# Patient Record
Sex: FEMALE | Race: WHITE | Employment: FULL TIME
[De-identification: names, ages, dates, MRNs, and addresses within clinical notes are randomized per-mention and may not be internally consistent; named-entity substitution may affect disease eponyms.]

---

## 2017-01-18 DIAGNOSIS — E55.9 VITAMIN D DEFICIENCY: ICD-10-CM

## 2017-01-18 RX ORDER — ERGOCALCIFEROL 1.25 MG/1
50000 CAPSULE ORAL WEEKLY
Qty: 8 CAPSULE | Refills: 0 | Status: SHIPPED | OUTPATIENT
Start: 2017-01-18 | End: 2017-04-12 | Stop reason: ALTCHOICE

## 2017-01-26 ENCOUNTER — OFFICE VISIT (OUTPATIENT)
Dept: BARIATRICS/WEIGHT MGMT | Facility: CLINIC | Age: 52
End: 2017-01-26

## 2017-01-26 VITALS — WEIGHT: 244 LBS | BODY MASS INDEX: 43.92 KG/M2

## 2017-01-26 VITALS
RESPIRATION RATE: 20 BRPM | HEIGHT: 63 IN | DIASTOLIC BLOOD PRESSURE: 70 MMHG | HEART RATE: 70 BPM | SYSTOLIC BLOOD PRESSURE: 122 MMHG

## 2017-01-26 DIAGNOSIS — E66.01 MORBID OBESITY WITH BMI OF 40.0-44.9, ADULT (HCC): ICD-10-CM

## 2017-01-26 DIAGNOSIS — E66.01 MORBID OBESITY WITH BMI OF 40.0-44.9, ADULT (HCC): Primary | ICD-10-CM

## 2017-01-26 DIAGNOSIS — E55.9 VITAMIN D DEFICIENCY: Primary | ICD-10-CM

## 2017-01-26 PROCEDURE — 99213 OFFICE O/P EST LOW 20 MIN: CPT | Performed by: SURGERY

## 2017-01-26 RX ORDER — PHENTERMINE HYDROCHLORIDE 15 MG/1
15 CAPSULE ORAL EVERY MORNING
Qty: 30 CAPSULE | Refills: 0 | Status: SHIPPED | OUTPATIENT
Start: 2017-01-26 | End: 2017-02-25

## 2017-01-31 ENCOUNTER — TELEPHONE (OUTPATIENT)
Dept: BARIATRICS/WEIGHT MGMT | Facility: CLINIC | Age: 52
End: 2017-01-31

## 2017-03-08 ENCOUNTER — OFFICE VISIT (OUTPATIENT)
Dept: BARIATRICS/WEIGHT MGMT | Facility: CLINIC | Age: 52
End: 2017-03-08

## 2017-03-08 VITALS
WEIGHT: 231 LBS | HEART RATE: 64 BPM | HEIGHT: 63 IN | SYSTOLIC BLOOD PRESSURE: 107 MMHG | BODY MASS INDEX: 40.93 KG/M2 | DIASTOLIC BLOOD PRESSURE: 75 MMHG

## 2017-03-08 DIAGNOSIS — E04.2 NONTOXIC MULTINODULAR GOITER: ICD-10-CM

## 2017-03-08 DIAGNOSIS — E55.9 VITAMIN D DEFICIENCY: Primary | ICD-10-CM

## 2017-03-08 DIAGNOSIS — E66.01 MORBID OBESITY, UNSPECIFIED OBESITY TYPE (HCC): ICD-10-CM

## 2017-03-08 PROCEDURE — 99213 OFFICE O/P EST LOW 20 MIN: CPT | Performed by: SURGERY

## 2017-03-08 RX ORDER — PHENTERMINE HYDROCHLORIDE 37.5 MG/1
37.5 TABLET ORAL
Qty: 30 TABLET | Refills: 0 | Status: SHIPPED | OUTPATIENT
Start: 2017-03-08 | End: 2017-04-07

## 2017-03-08 RX ORDER — PHENTERMINE HYDROCHLORIDE 37.5 MG/1
CAPSULE ORAL
Refills: 0 | COMMUNITY
Start: 2017-02-02 | End: 2017-04-12 | Stop reason: DRUGHIGH

## 2017-04-12 ENCOUNTER — OFFICE VISIT (OUTPATIENT)
Dept: BARIATRICS/WEIGHT MGMT | Age: 52
End: 2017-04-12

## 2017-04-12 VITALS
SYSTOLIC BLOOD PRESSURE: 104 MMHG | HEIGHT: 63 IN | DIASTOLIC BLOOD PRESSURE: 83 MMHG | WEIGHT: 225 LBS | RESPIRATION RATE: 20 BRPM | BODY MASS INDEX: 39.87 KG/M2 | HEART RATE: 74 BPM

## 2017-04-12 DIAGNOSIS — E66.01 MORBID OBESITY, UNSPECIFIED OBESITY TYPE (HCC): Primary | ICD-10-CM

## 2017-04-12 PROCEDURE — 99212 OFFICE O/P EST SF 10 MIN: CPT | Performed by: SURGERY

## 2017-04-12 RX ORDER — PHENTERMINE HYDROCHLORIDE 15 MG/1
15 CAPSULE ORAL EVERY MORNING
Qty: 30 CAPSULE | Refills: 0 | Status: SHIPPED | OUTPATIENT
Start: 2017-04-12 | End: 2017-05-12

## 2017-05-04 ENCOUNTER — TELEPHONE (OUTPATIENT)
Dept: BARIATRICS/WEIGHT MGMT | Age: 52
End: 2017-05-04

## 2017-10-11 ENCOUNTER — OFFICE VISIT (OUTPATIENT)
Dept: BARIATRICS/WEIGHT MGMT | Age: 52
End: 2017-10-11
Payer: COMMERCIAL

## 2017-10-11 VITALS
HEIGHT: 63 IN | HEART RATE: 72 BPM | WEIGHT: 235 LBS | DIASTOLIC BLOOD PRESSURE: 61 MMHG | SYSTOLIC BLOOD PRESSURE: 79 MMHG | BODY MASS INDEX: 41.64 KG/M2 | RESPIRATION RATE: 20 BRPM

## 2017-10-11 DIAGNOSIS — E66.01 MORBID OBESITY WITH BMI OF 40.0-44.9, ADULT (HCC): ICD-10-CM

## 2017-10-11 DIAGNOSIS — E55.9 VITAMIN D DEFICIENCY: Primary | ICD-10-CM

## 2017-10-11 PROCEDURE — 99213 OFFICE O/P EST LOW 20 MIN: CPT | Performed by: SURGERY

## 2017-10-11 RX ORDER — PHENTERMINE HYDROCHLORIDE 37.5 MG/1
37.5 TABLET ORAL
Qty: 30 TABLET | Refills: 0 | Status: SHIPPED | OUTPATIENT
Start: 2017-10-11 | End: 2017-11-10

## 2017-10-12 NOTE — PROGRESS NOTES
Medical Nutrition Therapy  Weight Loss Medication  Initial Consult    Pt reports:  Pt reports that she identifies no needs at this time; she is not sure why she is scheduled with dietitian; was seen originally for weight loss medication but did not return for follow up after one month; today's appt was not given the appropriate time to review and set goals    Please see note dated 1/26/17 for original assessment  In 3 months what behaviors would you like to be doing on a consistent basis: Pt does not have 3 months goals in place   5% weight loss goal over 3 months: 11.75 lbs. Goal weight: 223.25 lbs. Vitals: Wt Readings from Last 3 Encounters:   10/11/17 235 lb (106.6 kg)   04/12/17 225 lb (102.1 kg)   04/05/17 228 lb (103.4 kg)     gained 10 lbs over 6 months     Bariatric Surgery patients:  Goals   60-80gm of protein  48-64oz of fluid  Vitamin and calcium consistency  Consistency with post op visits  Did not review these goals with the patient today     [] met     []  Not met    Nutrition Assessment:   PES: Knowledge deficit related to healthy behaviors that support weight management evidenced by Body mass index is 42.3 kg/m². Nutrition Assessment of Goal Attainment:  3 month goals: see above Does not have three month goals at this time  Bi Weekly TREATMENT GOALS:        Did not set biweekly treatment goals today                                               Do you understand your goals? n/a    Do you have the information you need to achieve your goals? n/a    Do you have any questions  right now? n/a        []n/a  Consistent goal achievement thus far and further success with goals is expected. n/a  Unable to consistently make progress in goal achievement. At this time patient is not moving forward  in developing the skills needed for long term success with managing weight and health. Recommend patient follow up with therapist. D/W provider. Plan:    Set three month and biweekly goals.
need for consistency with MVI and Ca supplements  Return in 1 month

## 2017-11-07 ENCOUNTER — TELEPHONE (OUTPATIENT)
Dept: OBGYN | Age: 52
End: 2017-11-07

## 2017-11-07 DIAGNOSIS — N95.2 VAGINAL ATROPHY: Primary | ICD-10-CM

## 2017-11-07 RX ORDER — ESTRADIOL 0.1 MG/G
1 CREAM VAGINAL DAILY
Qty: 1 TUBE | Refills: 3 | Status: SHIPPED | OUTPATIENT
Start: 2017-11-07 | End: 2018-10-23 | Stop reason: SDUPTHER

## 2017-11-07 NOTE — TELEPHONE ENCOUNTER
Estrace vaginal cream is sent, please have pt schedule an appointment in 1 month to f/u with symptoms following initiation of treatment.

## 2017-11-08 ENCOUNTER — OFFICE VISIT (OUTPATIENT)
Dept: BARIATRICS/WEIGHT MGMT | Age: 52
End: 2017-11-08
Payer: COMMERCIAL

## 2017-11-08 VITALS
HEIGHT: 63 IN | WEIGHT: 233 LBS | SYSTOLIC BLOOD PRESSURE: 116 MMHG | HEART RATE: 76 BPM | BODY MASS INDEX: 41.29 KG/M2 | RESPIRATION RATE: 20 BRPM | DIASTOLIC BLOOD PRESSURE: 86 MMHG

## 2017-11-08 DIAGNOSIS — I48.91 ATRIAL FIBRILLATION WITH RVR (HCC): Primary | ICD-10-CM

## 2017-11-08 DIAGNOSIS — E66.01 MORBID OBESITY WITH BMI OF 40.0-44.9, ADULT (HCC): ICD-10-CM

## 2017-11-08 PROCEDURE — 99213 OFFICE O/P EST LOW 20 MIN: CPT | Performed by: SURGERY

## 2017-11-08 NOTE — PROGRESS NOTES
The patient is here today for continued supervised weight loss in preparation for undergoing metabolic surgery. She reports that she is working on the behavior changes discussed at her initial appointment and has ongoing collaboration with our RD to continue this work. Overall, the patient reports that she is having good success with her behavior changes and compliance with our program.   She was given her first month of Adipex which she only just got filled about 1 week ago. She has been taking it and reports no issues    Weight is down 2 lbs. Exercising? :  yes    Patient is exhibiting compliance with behavior modification: yes    Vitals:    11/08/17 1036   BP: 116/86   Site: Left Arm   Position: Sitting   Cuff Size: Large Adult   Pulse: 76   Resp: 20   Weight: 233 lb (105.7 kg)   Height: 5' 2.5\" (1.588 m)     Body mass index is 41.94 kg/m². Gen: Alert and oriented x 3; NAD  HEENT: MMM No Rhinorrhea, EOMI; PERRLA; Thyroid not enlarged; no cervical nodes  ABD:  Soft, Non-Tender, Non-distended, No rebound or guarding. Ext:  Movement x 4. No edema  Neuro: Cranial Nerves Grossly Intact; nml coordination, no pronator drift     Assessment:    Patient Active Problem List   Diagnosis    Anemia    Colon cancer screening    Chest pain    New onset atrial fibrillation (HCC)    Atrial fibrillation with RVR (HCC)    Chest discomfort    Obesity    Nontoxic multinodular goiter    Epigastric pain    Vitamin D deficiency                              Afib-stable  Morbid Obesity-stable      Plan:  Continue working with staff on behavior changes  Encouraged to journal all food intake. Keep calorie level at approximately 3814-2034 or as determined by RD. Protein intake is to be a minimum of 60-80 grams per day. Water drinking was encouraged with a goal of 64oz-128oz daily. I also emphasized the need for use of the exercise log              She will return in month for continued supervision.     It is

## 2018-01-03 ENCOUNTER — OFFICE VISIT (OUTPATIENT)
Dept: OBGYN | Age: 53
End: 2018-01-03
Payer: COMMERCIAL

## 2018-01-03 VITALS
DIASTOLIC BLOOD PRESSURE: 74 MMHG | WEIGHT: 238 LBS | SYSTOLIC BLOOD PRESSURE: 122 MMHG | BODY MASS INDEX: 43.79 KG/M2 | HEIGHT: 62 IN

## 2018-01-03 DIAGNOSIS — N94.10 DYSPAREUNIA, FEMALE: Primary | ICD-10-CM

## 2018-01-03 PROCEDURE — 99213 OFFICE O/P EST LOW 20 MIN: CPT | Performed by: OBSTETRICS & GYNECOLOGY

## 2018-01-03 NOTE — PROGRESS NOTES
PROBLEM VISIT     Date of service: 1/3/2018    Andres Alvarado  Is a 47 y.o.  female    PT's PCP is: Janel Greene MD     : 1965                                             Subjective:       No LMP recorded. Patient has had a hysterectomy. OB History   No data available        Allergies: Codeine    Chief Complaint   Patient presents with    Discuss Medications     patient presents today for F/U new start Estrace for vaginal atrophy. PE:  Vital Signs  Blood pressure 122/74, height 5' 2\" (1.575 m), weight 238 lb (108 kg), not currently breastfeeding. Labs:    No results found for this visit on 18. NURSE: SOFIA    HPI: Pt returns for evaluation of dyspareunia and condyloma. She is s/p MLT and was noted to have condyloma present at the inferior introitus at her last follow up. It was treated w/ TCA and she was started on Estrace cream 2x/wk. She states she has been doing well, has not had much discomfort since initiation of cream. She denies frequent intimacy but states it is unrelated to her discomfort at this point. Objective:   Physical Exam   Constitutional: She is well-developed, well-nourished, and in no distress. Cardiovascular: Normal rate and regular rhythm. Pulmonary/Chest: Effort normal and breath sounds normal.   Abdominal: Soft. There is no tenderness. Genitourinary: Vagina normal.   Genitourinary Comments: External genitalia wnl, well estrogenized, no evidence of condyloma   Musculoskeletal: Normal range of motion. Assessment and Plan       1. Dyspareunia, female           Improved w/ supplemental estrogen. Pt will call w/ concerns and otherwise return for annuals. Discussed sx of condyloma and treatment options if they recur.          FF time: 15 min    Over 75%of time spent on counseling and care coordination on: see assessment and plan,  She was also counseled on her preventative health maintenance recommendations and follow-up.       Shannen Todd,1/3/2018 10:58 AM

## 2018-09-10 ENCOUNTER — HOSPITAL ENCOUNTER (OUTPATIENT)
Dept: WOMENS IMAGING | Age: 53
Discharge: HOME | End: 2018-09-12
Payer: COMMERCIAL

## 2018-09-10 ENCOUNTER — HOSPITAL ENCOUNTER (OUTPATIENT)
Dept: BONE DENSITY | Age: 53
Discharge: HOME | End: 2018-09-12
Payer: COMMERCIAL

## 2018-09-10 DIAGNOSIS — Z12.39 SCREENING BREAST EXAMINATION: ICD-10-CM

## 2018-09-10 DIAGNOSIS — Z13.820 SCREENING FOR OSTEOPOROSIS: ICD-10-CM

## 2018-09-10 PROCEDURE — 77067 SCR MAMMO BI INCL CAD: CPT

## 2018-09-10 PROCEDURE — 77080 DXA BONE DENSITY AXIAL: CPT

## 2018-10-23 ENCOUNTER — OFFICE VISIT (OUTPATIENT)
Dept: OBGYN | Age: 53
End: 2018-10-23
Payer: COMMERCIAL

## 2018-10-23 ENCOUNTER — HOSPITAL ENCOUNTER (OUTPATIENT)
Age: 53
Setting detail: SPECIMEN
Discharge: HOME | End: 2018-10-23
Payer: COMMERCIAL

## 2018-10-23 VITALS
SYSTOLIC BLOOD PRESSURE: 124 MMHG | BODY MASS INDEX: 45.27 KG/M2 | HEIGHT: 62 IN | WEIGHT: 246 LBS | DIASTOLIC BLOOD PRESSURE: 80 MMHG

## 2018-10-23 DIAGNOSIS — Z01.419 WOMEN'S ANNUAL ROUTINE GYNECOLOGICAL EXAMINATION: ICD-10-CM

## 2018-10-23 DIAGNOSIS — Z01.419 WOMEN'S ANNUAL ROUTINE GYNECOLOGICAL EXAMINATION: Primary | ICD-10-CM

## 2018-10-23 PROCEDURE — 87624 HPV HI-RISK TYP POOLED RSLT: CPT

## 2018-10-23 PROCEDURE — 99396 PREV VISIT EST AGE 40-64: CPT | Performed by: OBSTETRICS & GYNECOLOGY

## 2018-10-23 PROCEDURE — G0145 SCR C/V CYTO,THINLAYER,RESCR: HCPCS

## 2018-10-23 RX ORDER — ESTRADIOL 0.1 MG/G
1 CREAM VAGINAL
Qty: 1 TUBE | Refills: 3 | Status: SHIPPED | OUTPATIENT
Start: 2018-10-25

## 2018-10-23 ASSESSMENT — ENCOUNTER SYMPTOMS: SHORTNESS OF BREATH: 0

## 2018-10-24 LAB
HPV SAMPLE: NORMAL
HPV SOURCE: NORMAL
HPV, GENOTYPE 16: NOT DETECTED
HPV, GENOTYPE 18: NOT DETECTED
HPV, HIGH RISK OTHER: NOT DETECTED
HPV, INTERPRETATION: NORMAL

## 2018-11-06 LAB — CYTOLOGY REPORT: NORMAL

## 2019-01-21 DIAGNOSIS — M81.0 OSTEOPOROSIS, UNSPECIFIED OSTEOPOROSIS TYPE, UNSPECIFIED PATHOLOGICAL FRACTURE PRESENCE: ICD-10-CM

## 2019-01-21 RX ORDER — DIPHENHYDRAMINE HYDROCHLORIDE 50 MG/ML
50 INJECTION INTRAMUSCULAR; INTRAVENOUS ONCE
Status: CANCELLED | OUTPATIENT
Start: 2019-01-21 | End: 2019-01-21

## 2019-01-21 RX ORDER — METHYLPREDNISOLONE SODIUM SUCCINATE 125 MG/2ML
125 INJECTION, POWDER, LYOPHILIZED, FOR SOLUTION INTRAMUSCULAR; INTRAVENOUS ONCE
Status: CANCELLED | OUTPATIENT
Start: 2019-01-21 | End: 2019-01-21

## 2019-01-21 RX ORDER — ZOLEDRONIC ACID 5 MG/100ML
5 INJECTION, SOLUTION INTRAVENOUS ONCE
Status: CANCELLED | OUTPATIENT
Start: 2019-01-21 | End: 2019-01-21

## 2019-01-21 RX ORDER — HEPARIN SODIUM (PORCINE) LOCK FLUSH IV SOLN 100 UNIT/ML 100 UNIT/ML
500 SOLUTION INTRAVENOUS PRN
Status: CANCELLED | OUTPATIENT
Start: 2019-01-21

## 2019-01-21 RX ORDER — SODIUM CHLORIDE 0.9 % (FLUSH) 0.9 %
5 SYRINGE (ML) INJECTION PRN
Status: CANCELLED | OUTPATIENT
Start: 2019-01-21

## 2019-01-21 RX ORDER — 0.9 % SODIUM CHLORIDE 0.9 %
500 INTRAVENOUS SOLUTION INTRAVENOUS ONCE
Status: CANCELLED | OUTPATIENT
Start: 2019-01-21 | End: 2019-01-21

## 2019-01-21 RX ORDER — SODIUM CHLORIDE 9 MG/ML
INJECTION, SOLUTION INTRAVENOUS CONTINUOUS
Status: CANCELLED | OUTPATIENT
Start: 2019-01-21

## 2019-01-21 RX ORDER — SODIUM CHLORIDE 9 MG/ML
INJECTION, SOLUTION INTRAVENOUS ONCE
Status: CANCELLED | OUTPATIENT
Start: 2019-01-21 | End: 2019-01-21

## 2019-01-21 RX ORDER — 0.9 % SODIUM CHLORIDE 0.9 %
250 INTRAVENOUS SOLUTION INTRAVENOUS ONCE
Status: CANCELLED | OUTPATIENT
Start: 2019-01-21 | End: 2019-01-21

## 2019-01-21 RX ORDER — 0.9 % SODIUM CHLORIDE 0.9 %
10 VIAL (ML) INJECTION ONCE
Status: CANCELLED | OUTPATIENT
Start: 2019-01-21 | End: 2019-01-21

## 2019-01-21 RX ORDER — EPINEPHRINE 1 MG/ML
0.3 INJECTION, SOLUTION, CONCENTRATE INTRAVENOUS PRN
Status: CANCELLED | OUTPATIENT
Start: 2019-01-21

## 2019-01-21 RX ORDER — SODIUM CHLORIDE 0.9 % (FLUSH) 0.9 %
10 SYRINGE (ML) INJECTION PRN
Status: CANCELLED | OUTPATIENT
Start: 2019-01-21

## 2019-02-04 ENCOUNTER — HOSPITAL ENCOUNTER (OUTPATIENT)
Dept: INFUSION THERAPY | Age: 54
Discharge: HOME | End: 2019-02-04
Payer: COMMERCIAL

## 2019-02-04 VITALS
TEMPERATURE: 98.3 F | RESPIRATION RATE: 20 BRPM | SYSTOLIC BLOOD PRESSURE: 123 MMHG | HEART RATE: 56 BPM | DIASTOLIC BLOOD PRESSURE: 74 MMHG

## 2019-02-04 DIAGNOSIS — M81.0 OSTEOPOROSIS, UNSPECIFIED OSTEOPOROSIS TYPE, UNSPECIFIED PATHOLOGICAL FRACTURE PRESENCE: ICD-10-CM

## 2019-02-04 PROCEDURE — 6360000002 HC RX W HCPCS: Performed by: FAMILY MEDICINE

## 2019-02-04 PROCEDURE — 96365 THER/PROPH/DIAG IV INF INIT: CPT

## 2019-02-04 PROCEDURE — 2580000003 HC RX 258: Performed by: FAMILY MEDICINE

## 2019-02-04 RX ORDER — 0.9 % SODIUM CHLORIDE 0.9 %
10 VIAL (ML) INJECTION ONCE
Status: CANCELLED | OUTPATIENT
Start: 2019-02-04 | End: 2019-02-04

## 2019-02-04 RX ORDER — EPINEPHRINE 1 MG/ML
0.3 INJECTION, SOLUTION, CONCENTRATE INTRAVENOUS PRN
Status: CANCELLED | OUTPATIENT
Start: 2019-02-04

## 2019-02-04 RX ORDER — 0.9 % SODIUM CHLORIDE 0.9 %
250 INTRAVENOUS SOLUTION INTRAVENOUS ONCE
Status: CANCELLED | OUTPATIENT
Start: 2019-02-04 | End: 2019-02-04

## 2019-02-04 RX ORDER — SODIUM CHLORIDE 9 MG/ML
INJECTION, SOLUTION INTRAVENOUS ONCE
Status: CANCELLED | OUTPATIENT
Start: 2019-02-04 | End: 2019-02-04

## 2019-02-04 RX ORDER — 0.9 % SODIUM CHLORIDE 0.9 %
500 INTRAVENOUS SOLUTION INTRAVENOUS ONCE
Status: CANCELLED | OUTPATIENT
Start: 2019-02-04 | End: 2019-02-04

## 2019-02-04 RX ORDER — CHOLECALCIFEROL (VITAMIN D3) 125 MCG
500 CAPSULE ORAL DAILY
COMMUNITY

## 2019-02-04 RX ORDER — SODIUM CHLORIDE 9 MG/ML
INJECTION, SOLUTION INTRAVENOUS CONTINUOUS
Status: CANCELLED | OUTPATIENT
Start: 2019-02-04

## 2019-02-04 RX ORDER — DIPHENHYDRAMINE HYDROCHLORIDE 50 MG/ML
50 INJECTION INTRAMUSCULAR; INTRAVENOUS ONCE
Status: CANCELLED | OUTPATIENT
Start: 2019-02-04 | End: 2019-02-04

## 2019-02-04 RX ORDER — SODIUM CHLORIDE 0.9 % (FLUSH) 0.9 %
5 SYRINGE (ML) INJECTION PRN
Status: CANCELLED | OUTPATIENT
Start: 2019-02-04

## 2019-02-04 RX ORDER — ZOLEDRONIC ACID 5 MG/100ML
5 INJECTION, SOLUTION INTRAVENOUS ONCE
Status: CANCELLED | OUTPATIENT
Start: 2019-02-04 | End: 2019-02-04

## 2019-02-04 RX ORDER — SODIUM CHLORIDE 0.9 % (FLUSH) 0.9 %
10 SYRINGE (ML) INJECTION PRN
Status: CANCELLED | OUTPATIENT
Start: 2019-02-04

## 2019-02-04 RX ORDER — HEPARIN SODIUM (PORCINE) LOCK FLUSH IV SOLN 100 UNIT/ML 100 UNIT/ML
500 SOLUTION INTRAVENOUS PRN
Status: CANCELLED | OUTPATIENT
Start: 2019-02-04

## 2019-02-04 RX ORDER — SODIUM CHLORIDE 9 MG/ML
INJECTION, SOLUTION INTRAVENOUS ONCE
Status: COMPLETED | OUTPATIENT
Start: 2019-02-04 | End: 2019-02-04

## 2019-02-04 RX ORDER — ZOLEDRONIC ACID 5 MG/100ML
5 INJECTION, SOLUTION INTRAVENOUS ONCE
Status: COMPLETED | OUTPATIENT
Start: 2019-02-04 | End: 2019-02-04

## 2019-02-04 RX ORDER — SODIUM CHLORIDE 0.9 % (FLUSH) 0.9 %
10 SYRINGE (ML) INJECTION PRN
Status: DISCONTINUED | OUTPATIENT
Start: 2019-02-04 | End: 2019-02-05 | Stop reason: HOSPADM

## 2019-02-04 RX ORDER — METHYLPREDNISOLONE SODIUM SUCCINATE 125 MG/2ML
125 INJECTION, POWDER, LYOPHILIZED, FOR SOLUTION INTRAMUSCULAR; INTRAVENOUS ONCE
Status: CANCELLED | OUTPATIENT
Start: 2019-02-04 | End: 2019-02-04

## 2019-02-04 RX ADMIN — ZOLEDRONIC ACID 5 MG: 5 INJECTION, SOLUTION INTRAVENOUS at 11:52

## 2019-02-04 RX ADMIN — SODIUM CHLORIDE: 9 INJECTION, SOLUTION INTRAVENOUS at 11:48

## 2019-02-04 RX ADMIN — Medication 10 ML: at 11:47

## 2019-07-24 ENCOUNTER — HOSPITAL ENCOUNTER (EMERGENCY)
Age: 54
Discharge: HOME | End: 2019-07-24
Attending: EMERGENCY MEDICINE
Payer: COMMERCIAL

## 2019-07-24 ENCOUNTER — APPOINTMENT (OUTPATIENT)
Dept: GENERAL RADIOLOGY | Age: 54
End: 2019-07-24
Payer: COMMERCIAL

## 2019-07-24 VITALS
DIASTOLIC BLOOD PRESSURE: 82 MMHG | BODY MASS INDEX: 42.8 KG/M2 | WEIGHT: 234 LBS | HEART RATE: 88 BPM | TEMPERATURE: 98.3 F | SYSTOLIC BLOOD PRESSURE: 121 MMHG | OXYGEN SATURATION: 96 % | RESPIRATION RATE: 16 BRPM

## 2019-07-24 DIAGNOSIS — R07.9 CHEST PAIN, UNSPECIFIED TYPE: Primary | ICD-10-CM

## 2019-07-24 LAB
ABSOLUTE EOS #: 0.22 K/UL (ref 0–0.44)
ABSOLUTE IMMATURE GRANULOCYTE: 0.03 K/UL (ref 0–0.3)
ABSOLUTE LYMPH #: 1.94 K/UL (ref 1.1–3.7)
ABSOLUTE MONO #: 0.56 K/UL (ref 0.1–1.2)
ANION GAP SERPL CALCULATED.3IONS-SCNC: 12 MMOL/L (ref 9–17)
BASOPHILS # BLD: 1 % (ref 0–2)
BASOPHILS ABSOLUTE: 0.05 K/UL (ref 0–0.2)
BNP INTERPRETATION: NORMAL
BUN BLDV-MCNC: 21 MG/DL (ref 6–20)
BUN/CREAT BLD: 30 (ref 9–20)
CALCIUM SERPL-MCNC: 9.3 MG/DL (ref 8.6–10.4)
CHLORIDE BLD-SCNC: 103 MMOL/L (ref 98–107)
CO2: 26 MMOL/L (ref 20–31)
CREAT SERPL-MCNC: 0.69 MG/DL (ref 0.5–0.9)
DIFFERENTIAL TYPE: NORMAL
EOSINOPHILS RELATIVE PERCENT: 3 % (ref 1–4)
GFR AFRICAN AMERICAN: >60 ML/MIN
GFR NON-AFRICAN AMERICAN: >60 ML/MIN
GFR SERPL CREATININE-BSD FRML MDRD: ABNORMAL ML/MIN/{1.73_M2}
GFR SERPL CREATININE-BSD FRML MDRD: ABNORMAL ML/MIN/{1.73_M2}
GLUCOSE BLD-MCNC: 97 MG/DL (ref 70–99)
HCT VFR BLD CALC: 42.2 % (ref 36.3–47.1)
HEMOGLOBIN: 13 G/DL (ref 11.9–15.1)
IMMATURE GRANULOCYTES: 0 %
LYMPHOCYTES # BLD: 24 % (ref 24–43)
MCH RBC QN AUTO: 27.7 PG (ref 25.2–33.5)
MCHC RBC AUTO-ENTMCNC: 30.8 G/DL (ref 28.4–34.8)
MCV RBC AUTO: 90 FL (ref 82.6–102.9)
MONOCYTES # BLD: 7 % (ref 3–12)
NRBC AUTOMATED: 0 PER 100 WBC
PDW BLD-RTO: 14 % (ref 11.8–14.4)
PLATELET # BLD: 236 K/UL (ref 138–453)
PLATELET ESTIMATE: NORMAL
PMV BLD AUTO: 11.3 FL (ref 8.1–13.5)
POTASSIUM SERPL-SCNC: 4 MMOL/L (ref 3.7–5.3)
PRO-BNP: 27 PG/ML
RBC # BLD: 4.69 M/UL (ref 3.95–5.11)
RBC # BLD: NORMAL 10*6/UL
SEG NEUTROPHILS: 65 % (ref 36–65)
SEGMENTED NEUTROPHILS ABSOLUTE COUNT: 5.18 K/UL (ref 1.5–8.1)
SODIUM BLD-SCNC: 141 MMOL/L (ref 135–144)
TROPONIN INTERP: NORMAL
TROPONIN INTERP: NORMAL
TROPONIN T: <0.03 NG/ML
TROPONIN T: <0.03 NG/ML
TROPONIN, HIGH SENSITIVITY: NORMAL NG/L (ref 0–14)
TROPONIN, HIGH SENSITIVITY: NORMAL NG/L (ref 0–14)
WBC # BLD: 8 K/UL (ref 3.5–11.3)
WBC # BLD: NORMAL 10*3/UL

## 2019-07-24 PROCEDURE — 99285 EMERGENCY DEPT VISIT HI MDM: CPT

## 2019-07-24 PROCEDURE — 84484 ASSAY OF TROPONIN QUANT: CPT

## 2019-07-24 PROCEDURE — 80048 BASIC METABOLIC PNL TOTAL CA: CPT

## 2019-07-24 PROCEDURE — 93005 ELECTROCARDIOGRAM TRACING: CPT | Performed by: EMERGENCY MEDICINE

## 2019-07-24 PROCEDURE — 83880 ASSAY OF NATRIURETIC PEPTIDE: CPT

## 2019-07-24 PROCEDURE — 71046 X-RAY EXAM CHEST 2 VIEWS: CPT

## 2019-07-24 PROCEDURE — 6370000000 HC RX 637 (ALT 250 FOR IP): Performed by: EMERGENCY MEDICINE

## 2019-07-24 PROCEDURE — 85025 COMPLETE CBC W/AUTO DIFF WBC: CPT

## 2019-07-24 PROCEDURE — 2580000003 HC RX 258: Performed by: EMERGENCY MEDICINE

## 2019-07-24 PROCEDURE — 36415 COLL VENOUS BLD VENIPUNCTURE: CPT

## 2019-07-24 RX ORDER — ASPIRIN 81 MG/1
324 TABLET, CHEWABLE ORAL ONCE
Status: COMPLETED | OUTPATIENT
Start: 2019-07-24 | End: 2019-07-24

## 2019-07-24 RX ORDER — 0.9 % SODIUM CHLORIDE 0.9 %
1000 INTRAVENOUS SOLUTION INTRAVENOUS ONCE
Status: COMPLETED | OUTPATIENT
Start: 2019-07-24 | End: 2019-07-24

## 2019-07-24 RX ORDER — FENTANYL CITRATE 50 UG/ML
50 INJECTION, SOLUTION INTRAMUSCULAR; INTRAVENOUS ONCE
Status: DISCONTINUED | OUTPATIENT
Start: 2019-07-24 | End: 2019-07-25 | Stop reason: HOSPADM

## 2019-07-24 RX ORDER — NAPROXEN 500 MG/1
500 TABLET ORAL 2 TIMES DAILY WITH MEALS
Qty: 60 TABLET | Refills: 0 | Status: SHIPPED | OUTPATIENT
Start: 2019-07-24 | End: 2019-07-25 | Stop reason: ALTCHOICE

## 2019-07-24 RX ORDER — NITROGLYCERIN 0.4 MG/1
0.4 TABLET SUBLINGUAL ONCE
Status: COMPLETED | OUTPATIENT
Start: 2019-07-24 | End: 2019-07-24

## 2019-07-24 RX ADMIN — NITROGLYCERIN 0.4 MG: 0.4 TABLET SUBLINGUAL at 19:38

## 2019-07-24 RX ADMIN — SODIUM CHLORIDE 1000 ML: 9 INJECTION, SOLUTION INTRAVENOUS at 19:30

## 2019-07-24 RX ADMIN — ASPIRIN 81 MG 324 MG: 81 TABLET ORAL at 19:32

## 2019-07-24 ASSESSMENT — ENCOUNTER SYMPTOMS
RHINORRHEA: 0
WHEEZING: 0
NAUSEA: 0
SHORTNESS OF BREATH: 0
ABDOMINAL PAIN: 0
VOMITING: 0
COLOR CHANGE: 0
BACK PAIN: 0

## 2019-07-24 ASSESSMENT — PAIN DESCRIPTION - FREQUENCY: FREQUENCY: CONTINUOUS

## 2019-07-24 ASSESSMENT — PAIN SCALES - GENERAL
PAINLEVEL_OUTOF10: 4
PAINLEVEL_OUTOF10: 0

## 2019-07-24 ASSESSMENT — HEART SCORE: ECG: 0

## 2019-07-24 ASSESSMENT — PAIN DESCRIPTION - PAIN TYPE: TYPE: ACUTE PAIN

## 2019-07-24 ASSESSMENT — PAIN DESCRIPTION - DESCRIPTORS: DESCRIPTORS: PRESSURE

## 2019-07-24 ASSESSMENT — PAIN DESCRIPTION - ORIENTATION: ORIENTATION: MID

## 2019-07-24 ASSESSMENT — PAIN DESCRIPTION - LOCATION: LOCATION: CHEST

## 2019-07-24 NOTE — ED PROVIDER NOTES
[facility] ED  Emergency Department Encounter       Pt Name:Melany Thapa  MRN: [id number]  Birthdate 1965  Date of evaluation: 19  PCP:  Ashely Negro       Chief Complaint   Patient presents with    Chest Pain     onset 2 days ago, states \"pressure\"    Fatigue     onset 2 days ago    Dizziness     onset today       HISTORY OF PRESENT ILLNESS  (Location/Symptom, Timing/Onset, Context/Setting, Quality, Duration, Modifying Factors, Severity.)      Ramiro Zepeda is a 53 y.o. female who presents with chest pressure. Patient says that chest pressure has been ongoing for the last 2 days, denies any exertional component to the chest pressure, stress test 3 years ago that was negative. No radiations of the pain, it is 4 out of 10, pressure-like. No fevers or chills, no cough congestion runny nose, does report some lightheadedness, no vertigo. No numbness or weakness, no facial droop, no dysarthria, no blurry vision. No history of PE, DVT, no asymmetrical leg swelling, no hemoptysis, no recent immobilization or recent surgery. Denies any cough congestion runny nose as well. No significant cardiac risk factors other than obesity and age. Denies hypertension, hyperlipidemia, history of smoking, denies any known history of coronary artery disease, however says that she has a history of atrial fibrillation. Follows up with Dr. Tran Allen with cardiology. Patient denies any shortness of breath as well. Denies any vertigo. PAST MEDICAL / SURGICAL / SOCIAL / FAMILY HISTORY      has a past medical history of Anemia, Chronic back pain, Depression, GERD (gastroesophageal reflux disease), Nontoxic multinodular goiter, and Osteoarthritis. has a past surgical history that includes Tubal ligation; hernia repair; Gastric bypass surgery; Hysterectomy, total abdominal; Colonoscopy (14); and  section ().     Social History     Socioeconomic History    Marital tablet Take 500 mcg by mouth daily   Yes Historical Provider, MD   estradiol (ESTRACE VAGINAL) 0.1 MG/GM vaginal cream Place 1 g vaginally Twice a Week Times 2 weeks then apply 2-3 times/week 10/25/18  Yes Sindy Perez DO   pantoprazole (PROTONIX) 40 MG tablet TAKE 1 TABLET ONCE A DAY ORALLY 1/16/18  Yes Corine Palmer MD   meloxicam (MOBIC) 15 MG tablet Take 15 mg by mouth daily  9/19/16  Yes Historical Provider, MD   aspirin  MG EC tablet Take 1 tablet by mouth daily. 3/12/15  Yes Joanne Thomas MD   Multiple Vitamins-Minerals (MULTIVITAMIN & MINERAL PO) Take 1 tablet by mouth daily. Takes Denisse-fusion   Yes Historical Provider, MD   calcium-vitamin D (OSCAL-500) 500-200 MG-UNIT per tablet Take 1 tablet by mouth daily. Yes Historical Provider, MD       REVIEW OF SYSTEMS    (2-9 systems for level 4, 10 or more for level 5)      Review of Systems   Constitutional: Negative for chills and fever. HENT: Negative for congestion and rhinorrhea. Respiratory: Negative for shortness of breath and wheezing. Cardiovascular: Positive for chest pain. Negative for leg swelling. Gastrointestinal: Negative for abdominal pain, nausea and vomiting. Genitourinary: Negative for dysuria and hematuria. Musculoskeletal: Negative for back pain. Skin: Negative for color change. Neurological: Positive for light-headedness. Negative for weakness and headaches. Psychiatric/Behavioral: Negative for agitation. PHYSICAL EXAM   (up to 7 for level 4, 8 or more for level 5)      INITIAL VITALS:   /82   Pulse 88   Temp 98.3 °F (36.8 °C) (Tympanic)   Resp 16   Wt 234 lb (106.1 kg)   SpO2 96%   BMI 42.80 kg/m²     Physical Exam   Constitutional: She appears well-developed and well-nourished. No distress. HENT:   Head: Normocephalic and atraumatic. Eyes: Conjunctivae and EOM are normal. Right eye exhibits no discharge. Left eye exhibits no discharge. Neck: Normal range of motion.  Neck

## 2019-07-25 ENCOUNTER — HOSPITAL ENCOUNTER (OUTPATIENT)
Dept: NON INVASIVE DIAGNOSTICS | Age: 54
Discharge: HOME | End: 2019-07-25
Payer: COMMERCIAL

## 2019-07-25 ENCOUNTER — OFFICE VISIT (OUTPATIENT)
Dept: CARDIOLOGY | Age: 54
End: 2019-07-25
Payer: COMMERCIAL

## 2019-07-25 ENCOUNTER — HOSPITAL ENCOUNTER (OUTPATIENT)
Age: 54
Discharge: HOME | End: 2019-07-25
Payer: COMMERCIAL

## 2019-07-25 VITALS
HEART RATE: 80 BPM | SYSTOLIC BLOOD PRESSURE: 112 MMHG | WEIGHT: 237 LBS | DIASTOLIC BLOOD PRESSURE: 80 MMHG | RESPIRATION RATE: 18 BRPM | HEIGHT: 62 IN | OXYGEN SATURATION: 98 % | BODY MASS INDEX: 43.61 KG/M2

## 2019-07-25 DIAGNOSIS — R53.82 CHRONIC FATIGUE: ICD-10-CM

## 2019-07-25 DIAGNOSIS — I48.0 PAF (PAROXYSMAL ATRIAL FIBRILLATION) (HCC): Primary | ICD-10-CM

## 2019-07-25 DIAGNOSIS — R07.89 ATYPICAL CHEST PAIN: ICD-10-CM

## 2019-07-25 DIAGNOSIS — G47.33 OSA (OBSTRUCTIVE SLEEP APNEA): ICD-10-CM

## 2019-07-25 DIAGNOSIS — I48.0 PAF (PAROXYSMAL ATRIAL FIBRILLATION) (HCC): ICD-10-CM

## 2019-07-25 DIAGNOSIS — E66.01 SEVERE OBESITY (BMI >= 40) (HCC): ICD-10-CM

## 2019-07-25 LAB
EKG ATRIAL RATE: 71 BPM
EKG P AXIS: 27 DEGREES
EKG P-R INTERVAL: 152 MS
EKG Q-T INTERVAL: 386 MS
EKG QRS DURATION: 80 MS
EKG QTC CALCULATION (BAZETT): 419 MS
EKG R AXIS: 2 DEGREES
EKG T AXIS: 13 DEGREES
EKG VENTRICULAR RATE: 71 BPM
TSH SERPL DL<=0.05 MIU/L-ACNC: 2.59 MIU/L (ref 0.3–5)

## 2019-07-25 PROCEDURE — 0296T HC EXT ECG RECORDING 2-21 DAY HOOKUP: CPT

## 2019-07-25 PROCEDURE — 36415 COLL VENOUS BLD VENIPUNCTURE: CPT

## 2019-07-25 PROCEDURE — 93010 ELECTROCARDIOGRAM REPORT: CPT | Performed by: FAMILY MEDICINE

## 2019-07-25 PROCEDURE — 93000 ELECTROCARDIOGRAM COMPLETE: CPT | Performed by: INTERNAL MEDICINE

## 2019-07-25 PROCEDURE — 84443 ASSAY THYROID STIM HORMONE: CPT

## 2019-07-25 PROCEDURE — 99243 OFF/OP CNSLTJ NEW/EST LOW 30: CPT | Performed by: INTERNAL MEDICINE

## 2019-07-25 NOTE — PROGRESS NOTES
Jett Begun am scribing for and in the presence of Dionne Lemus MD, F.A.C.C..    Patient: [place]  : 1965  Date of Visit: 2019    REASON FOR VISIT / CONSULTATION: Follow-Up from Hospital (HX: PAF. Pt was in ER yesturday for CP, Dizziness and Fatigue. Pt states she is doing ok. C/o: Chest Heaviness, most of the day this is there. Palpitaitons. Monday night felt a chest squeezing sensation. From than on she felt very exausted. Lighteaded/dizziness on/off. Denies: SOB)      History of Present Illness:        Dear Darshan Li had the pleasure of seeing Ms. Watkins today who is a 53 y.o. female who presents for evaluation of her chest discomfort. Past cardiac medical history consist of arriving at the emergency room on 3/12/2015 with new onset of atrial fibrillation with rapid ventricular response. The onset of her atrial fibrillation was less than 24 hours, so medical cardioversion using Propafenone was used and patient converted to normal sinus rhythm. Echo 3-, Relatively normal 2D echocardiogram with Doppler imaging. Global left ventricular systolic function appears preserved with an estimated ejection fraction of 65%. The left ventricular cavity size is within normal limits and the left ventricular wall thickness is within normal limits. No definite specific wall motion abnormalities were identified. No significant valvular abnormalities. No clear evidence of diastolic dysfunction is seen.     Stress test 3/24/2015,  Exercise duration 7 minutes No chest pain or ischemic ECG changes during stress. Low risk for coronary artery disease. Is done very well since 2016. She followed up with Genet Burris    Chest Disomfort: Ms. Suellen Pedro describes the discomfort as a tightening quality; occuring since Monday of this week; of mild-moderate intensity; Associated symptoms: palpations; Exacerbating factors: none; Remediating factors: none.  She reports this started on Monday

## 2019-07-25 NOTE — PATIENT INSTRUCTIONS
SURVEY:    You may be receiving a survey from [employer] regarding your visit today. Please complete the survey to enable us to provide the highest quality of care to you and your family. If you cannot score us a very good on any question, please call the office to discuss how we could have made your experience a very good one. Thank you.

## 2019-07-26 ENCOUNTER — TELEPHONE (OUTPATIENT)
Dept: CARDIOLOGY | Age: 54
End: 2019-07-26

## 2019-08-01 ENCOUNTER — HOSPITAL ENCOUNTER (OUTPATIENT)
Dept: NON INVASIVE DIAGNOSTICS | Age: 54
Discharge: HOME | End: 2019-08-01
Payer: COMMERCIAL

## 2019-08-01 DIAGNOSIS — I48.0 PAF (PAROXYSMAL ATRIAL FIBRILLATION) (HCC): ICD-10-CM

## 2019-08-01 DIAGNOSIS — R53.82 CHRONIC FATIGUE: ICD-10-CM

## 2019-08-01 DIAGNOSIS — R07.89 ATYPICAL CHEST PAIN: ICD-10-CM

## 2019-08-01 PROCEDURE — 93017 CV STRESS TEST TRACING ONLY: CPT

## 2019-08-01 PROCEDURE — 93018 CV STRESS TEST I&R ONLY: CPT | Performed by: FAMILY MEDICINE

## 2019-08-01 PROCEDURE — A9500 TC99M SESTAMIBI: HCPCS | Performed by: INTERNAL MEDICINE

## 2019-08-01 PROCEDURE — 93016 CV STRESS TEST SUPVJ ONLY: CPT | Performed by: FAMILY MEDICINE

## 2019-08-01 PROCEDURE — 78452 HT MUSCLE IMAGE SPECT MULT: CPT | Performed by: FAMILY MEDICINE

## 2019-08-01 PROCEDURE — 3430000000 HC RX DIAGNOSTIC RADIOPHARMACEUTICAL: Performed by: INTERNAL MEDICINE

## 2019-08-01 PROCEDURE — 78452 HT MUSCLE IMAGE SPECT MULT: CPT

## 2019-08-01 RX ADMIN — Medication 30 MILLICURIE: at 11:21

## 2019-08-05 ENCOUNTER — HOSPITAL ENCOUNTER (OUTPATIENT)
Dept: NON INVASIVE DIAGNOSTICS | Age: 54
Discharge: HOME | End: 2019-08-05
Payer: COMMERCIAL

## 2019-08-05 PROCEDURE — A9500 TC99M SESTAMIBI: HCPCS | Performed by: INTERNAL MEDICINE

## 2019-08-05 PROCEDURE — 3430000000 HC RX DIAGNOSTIC RADIOPHARMACEUTICAL: Performed by: INTERNAL MEDICINE

## 2019-08-05 RX ADMIN — Medication 30 MILLICURIE: at 12:30

## 2019-08-07 NOTE — PROCEDURES
mCi of sestamibi. At peak  exercise, the patient was injected with 30.0 mCi of sestamibi and  exercise was continued for 1 minute. Gating post-stress tomographic  imaging was performed 30-45 minutes after stress. STRESS ECG RESULTS:  The resting electrocardiogram demonstrated normal  sinus rhythm without definitive ST-segment abnormalities suggestive of  myocardial ischemia. At peak exercise and during recovery, the patient developed:    No significant ST segment changes suggestive of myocardial ischemia with  no premature atrial contractions (PACs) and no premature ventricular  contractions (PVCs). NUCLEAR IMAGING RESULTS:  The overall quality of the study is good. Mild attenuation artifact was seen. There is no evidence of abnormal  lung uptake. Additionally, the right ventricle appears normal.  The  left ventricular cavity is noted to be normal in size on the stress  images. There is no evidence of transient ischemic dilatation (TID) of  the left ventricle. Gated SPECT imaging reveals normal myocardial thickening and wall motion  with a calculated left ventricular ejection fraction of 84%. The rest images demonstrated a small perfusion abnormality of mild  intensity in the inferolateral region(s) which is most likely due to  artifact. SPECT images demonstrate homogeneous tracer distribution throughout the  myocardium. IMPRESSION:  1. Largely normal myocardial perfusion imaging with soft tissue artifact  but without evidence of significant myocardial ischemia or infarction. 2.  Global left ventricular systolic function was normal without  regional wall motion abnormalities. 3.  No significant electrocardiographic evidence of myocardial ischemia  during EKG monitoring without significant associated arrhythmias. The patient's Duke Treadmill score is 5, which correlates with a low  risk for significant coronary artery disease.     Overall, these results are most consistent with a

## 2019-08-08 ENCOUNTER — TELEPHONE (OUTPATIENT)
Dept: CARDIOLOGY | Age: 54
End: 2019-08-08

## 2019-08-08 ENCOUNTER — OFFICE VISIT (OUTPATIENT)
Dept: CARDIOLOGY | Age: 54
End: 2019-08-08
Payer: COMMERCIAL

## 2019-08-08 VITALS
OXYGEN SATURATION: 98 % | WEIGHT: 238 LBS | SYSTOLIC BLOOD PRESSURE: 121 MMHG | HEART RATE: 72 BPM | DIASTOLIC BLOOD PRESSURE: 80 MMHG | HEIGHT: 62 IN | BODY MASS INDEX: 43.79 KG/M2

## 2019-08-08 DIAGNOSIS — E66.01 SEVERE OBESITY (BMI >= 40) (HCC): ICD-10-CM

## 2019-08-08 DIAGNOSIS — E78.5 DYSLIPIDEMIA: ICD-10-CM

## 2019-08-08 DIAGNOSIS — R07.89 ATYPICAL CHEST PAIN: ICD-10-CM

## 2019-08-08 DIAGNOSIS — Z86.79 HISTORY OF ATRIAL FIBRILLATION: Primary | ICD-10-CM

## 2019-08-08 PROCEDURE — 99214 OFFICE O/P EST MOD 30 MIN: CPT | Performed by: INTERNAL MEDICINE

## 2019-08-08 RX ORDER — METOPROLOL SUCCINATE 25 MG/1
25 TABLET, EXTENDED RELEASE ORAL DAILY
Qty: 90 TABLET | Refills: 3 | Status: SHIPPED | OUTPATIENT
Start: 2019-08-08 | End: 2020-04-22

## 2019-08-08 NOTE — TELEPHONE ENCOUNTER
----- Message from Maya Pham MD sent at 8/7/2019  6:48 PM EDT -----  Test result normal.  No further action needed.

## 2019-12-09 ENCOUNTER — HOSPITAL ENCOUNTER (OUTPATIENT)
Dept: SLEEP CENTER | Age: 54
Discharge: HOME | End: 2019-12-09
Payer: COMMERCIAL

## 2019-12-09 PROCEDURE — 95806 SLEEP STUDY UNATT&RESP EFFT: CPT

## 2020-02-10 ENCOUNTER — OFFICE VISIT (OUTPATIENT)
Dept: CARDIOLOGY | Age: 55
End: 2020-02-10
Payer: COMMERCIAL

## 2020-02-10 VITALS
DIASTOLIC BLOOD PRESSURE: 73 MMHG | RESPIRATION RATE: 18 BRPM | HEART RATE: 61 BPM | SYSTOLIC BLOOD PRESSURE: 111 MMHG | OXYGEN SATURATION: 98 % | WEIGHT: 243 LBS | BODY MASS INDEX: 44.72 KG/M2 | HEIGHT: 62 IN

## 2020-02-10 PROCEDURE — 99213 OFFICE O/P EST LOW 20 MIN: CPT | Performed by: INTERNAL MEDICINE

## 2020-02-10 NOTE — PROGRESS NOTES
organomegaly, mass or bruit. Extremities: No significant edema. No cyanosis or clubbing. 2+ radial and carotid pulses. Distal extremity pulses: 2+ bilaterally. Neurological: Alertness and orientation as per Constitutional exam. No evidence of gross cranial nerve deficit. Coordination appeared normal.   Skin: Skin is warm and dry. There is no rash or diaphoresis. Psychiatric: She has a normal mood and affect. Her speech is normal and behavior is normal.      MOST RECENT LABS ON RECORD:   Lab Results   Component Value Date    WBC 8.0 07/24/2019    HGB 13.0 07/24/2019    HCT 42.2 07/24/2019     07/24/2019    CHOL 182 01/11/2017    TRIG 69 01/11/2017    HDL 83 01/11/2017    ALT 19 01/11/2017    AST 23 01/11/2017     07/24/2019    K 4.0 07/24/2019     07/24/2019    CREATININE 0.69 07/24/2019    BUN 21 (H) 07/24/2019    CO2 26 07/24/2019    TSH 2.59 07/25/2019    LABA1C 5.4 01/11/2017       ASSESSMENT:     1. PAF (paroxysmal atrial fibrillation) ([facility] [address]    2. Severe obesity (BMI >= 40) ([facility])    3. DONN (obstructive sleep apnea)       PLAN:      History of Paroxysmal Atrial Fibrillation: Currently maintaining sinus rhythm. Currently asymptomatic. Beta Blocker Therapy: Continue Metoprolol succinate (Toprol XL)  25 mg  daily I discussed the potential side effects of this medication including lightheadedness and dizziness and told her to call the office if this occurs. CHADS-Vasc score is 0, continue aspirin. Morbid obesity: Body mass index is 44.43 kg/m². I also briefly discussed both diet and exercise strategies for her to continue to loses weight and she was very receptive to this. Suspected Obstructive Sleep Apnea: I encouraged her to get the repeat study done. In the meantime, I encouraged Ms. Watkins to continue to take her other medications. FOLLOW UP:   I told Ms. Watkins to call my office if she had any problems, but otherwise I asked her to Return in about 1 year (around 2/10/2021). However, I would be happy to see her sooner should the need arise. Sincerely,  Gosia Walton MD, F.A.C.C. [facility] Cardiology Specialist    [address] [place] [address] [place], [place], [address] [place] [address]  Phone: [phone], Fax: [phone]     I believe that the risk of significant morbidity and mortality related to the patient's current medical conditions are: Intermediate. 15  minutes were spent with the patient and all of their questions were answered. The documentation recorded by the scribe, accurately and completely reflects the services I personally performed and the decisions made by me. Gosia Walton MD, F.A.C.C.  February 10, 2020

## 2020-02-10 NOTE — PATIENT INSTRUCTIONS
SURVEY:    You may be receiving a survey from [employer] regarding your visit today. Please complete the survey to enable us to provide the highest quality of care to you and your family. If you cannot score us a very good on any question, please call the office to discuss how we could have made your experience a very good one. Thank you.

## 2020-04-22 RX ORDER — METOPROLOL SUCCINATE 25 MG/1
TABLET, EXTENDED RELEASE ORAL
Qty: 90 TABLET | Refills: 3 | Status: SHIPPED | OUTPATIENT
Start: 2020-04-22 | End: 2021-03-31 | Stop reason: SDUPTHER

## 2020-07-09 ENCOUNTER — HOSPITAL ENCOUNTER (OUTPATIENT)
Dept: VASCULAR LAB | Age: 55
Discharge: HOME | End: 2020-07-11
Payer: COMMERCIAL

## 2020-07-09 PROCEDURE — 93971 EXTREMITY STUDY: CPT

## 2020-07-15 ENCOUNTER — HOSPITAL ENCOUNTER (OUTPATIENT)
Dept: CT IMAGING | Age: 55
Discharge: HOME | End: 2020-07-17
Payer: COMMERCIAL

## 2020-07-15 PROCEDURE — 74177 CT ABD & PELVIS W/CONTRAST: CPT

## 2020-07-15 PROCEDURE — 6360000004 HC RX CONTRAST MEDICATION: Performed by: NURSE PRACTITIONER

## 2020-07-15 RX ADMIN — IOHEXOL 20 ML: 240 INJECTION, SOLUTION INTRATHECAL; INTRAVASCULAR; INTRAVENOUS; ORAL at 10:09

## 2020-07-15 RX ADMIN — IOPAMIDOL 75 ML: 755 INJECTION, SOLUTION INTRAVENOUS at 10:09

## 2020-11-09 ENCOUNTER — HOSPITAL ENCOUNTER (OUTPATIENT)
Dept: INFUSION THERAPY | Age: 55
Discharge: HOME | End: 2020-11-09
Payer: COMMERCIAL

## 2020-11-09 VITALS
TEMPERATURE: 97.1 F | RESPIRATION RATE: 20 BRPM | DIASTOLIC BLOOD PRESSURE: 55 MMHG | SYSTOLIC BLOOD PRESSURE: 107 MMHG | HEART RATE: 55 BPM

## 2020-11-09 DIAGNOSIS — M81.0 OSTEOPOROSIS, UNSPECIFIED OSTEOPOROSIS TYPE, UNSPECIFIED PATHOLOGICAL FRACTURE PRESENCE: Primary | ICD-10-CM

## 2020-11-09 PROCEDURE — 6360000002 HC RX W HCPCS: Performed by: NURSE PRACTITIONER

## 2020-11-09 PROCEDURE — 96372 THER/PROPH/DIAG INJ SC/IM: CPT

## 2020-11-09 RX ORDER — MULTIVIT WITH MINERALS/LUTEIN
1000 TABLET ORAL DAILY
COMMUNITY
End: 2021-03-31

## 2020-11-09 RX ORDER — ZINC GLUCONATE 50 MG
50 TABLET ORAL DAILY
COMMUNITY

## 2020-11-09 RX ADMIN — DENOSUMAB 60 MG: 60 INJECTION SUBCUTANEOUS at 10:20

## 2021-03-31 ENCOUNTER — OFFICE VISIT (OUTPATIENT)
Dept: CARDIOLOGY | Age: 56
End: 2021-03-31
Payer: COMMERCIAL

## 2021-03-31 VITALS
OXYGEN SATURATION: 98 % | HEART RATE: 56 BPM | SYSTOLIC BLOOD PRESSURE: 93 MMHG | RESPIRATION RATE: 17 BRPM | WEIGHT: 221.8 LBS | DIASTOLIC BLOOD PRESSURE: 65 MMHG | HEIGHT: 62 IN | BODY MASS INDEX: 40.82 KG/M2

## 2021-03-31 DIAGNOSIS — I48.0 PAF (PAROXYSMAL ATRIAL FIBRILLATION) (HCC): Primary | ICD-10-CM

## 2021-03-31 DIAGNOSIS — E66.01 MORBID OBESITY (HCC): ICD-10-CM

## 2021-03-31 PROCEDURE — 99213 OFFICE O/P EST LOW 20 MIN: CPT | Performed by: INTERNAL MEDICINE

## 2021-03-31 PROCEDURE — 93000 ELECTROCARDIOGRAM COMPLETE: CPT | Performed by: INTERNAL MEDICINE

## 2021-03-31 RX ORDER — METOPROLOL SUCCINATE 25 MG/1
TABLET, EXTENDED RELEASE ORAL
Qty: 90 TABLET | Refills: 3 | Status: SHIPPED | OUTPATIENT
Start: 2021-03-31 | End: 2022-02-24 | Stop reason: SDUPTHER

## 2021-03-31 RX ORDER — MULTIVIT WITH MINERALS/LUTEIN
250 TABLET ORAL DAILY
COMMUNITY

## 2021-03-31 NOTE — PROGRESS NOTES
3/13/2006     Years since quitting: 15.0    Smokeless tobacco: Never Used   Substance Use Topics    Alcohol use: Yes     Alcohol/week: 0.0 standard drinks     Comment: rarely    Drug use: No        CURRENT MEDICATIONS:        Outpatient Medications Marked as Taking for the 3/31/21 encounter (Office Visit) with Paramjit Sellers MD   Medication Sig Dispense Refill    Ascorbic Acid (VITAMIN C) 250 MG tablet Take 250 mg by mouth daily      vitamin D (CHOLECALCIFEROL) 25 MCG (1000 UT) TABS tablet Take 1,000 Units by mouth daily      zinc 50 MG TABS tablet Take 50 mg by mouth daily      Ferrous Gluconate (IRON 27 PO) Take by mouth daily      metoprolol succinate (TOPROL XL) 25 MG extended release tablet TAKE 1 TABLET BY MOUTH EVERY DAY 90 tablet 3    vitamin B-12 (CYANOCOBALAMIN) 500 MCG tablet Take 500 mcg by mouth daily      estradiol (ESTRACE VAGINAL) 0.1 MG/GM vaginal cream Place 1 g vaginally Twice a Week Times 2 weeks then apply 2-3 times/week 1 Tube 3    pantoprazole (PROTONIX) 40 MG tablet TAKE 1 TABLET ONCE A DAY ORALLY 90 tablet 3    meloxicam (MOBIC) 15 MG tablet Take 15 mg by mouth daily       aspirin  MG EC tablet Take 1 tablet by mouth daily. (Patient taking differently: Take 81 mg by mouth daily ) 30 tablet 3    Multiple Vitamins-Minerals (MULTIVITAMIN & MINERAL PO) Take 1 tablet by mouth daily. Takes Denisse-fusion      calcium-vitamin D (OSCAL-500) 500-200 MG-UNIT per tablet Take 1 tablet by mouth daily. FAMILY HISTORY: family history includes Diabetes in her brother; Heart Disease in her father; High Cholesterol in her mother. Physical Examination:     BP 93/65 (Site: Left Upper Arm, Position: Sitting, Cuff Size: Large Adult)   Pulse 56   Resp 17   Ht 5' 2\" (1.575 m)   Wt 221 lb 12.8 oz (100.6 kg)   SpO2 98%   BMI 40.57 kg/m²  Body mass index is 40.57 kg/m². Constitutional: She appeared oriented to person and place. She appears well-developed and well-nourished.  In no acute distress. HEENT: Normocephalic and atraumatic. No JVD present. Carotid bruit is not present. No mass and no thyromegaly present. No lymphadenopathy noted. Cardiovascular: Normal rate, regular rhythm, normal heart sounds. Exam reveals no gallop and no friction rubs. No murmur was heard. Pulmonary/Chest: Effort normal and breath sounds normal. No respiratory distress. She has no wheezes, rhonchi or rales. Abdominal: Soft, non-tender. She exhibits no organomegaly, mass or bruit. Extremities: No significant edema. No cyanosis or clubbing. 2+ radial and carotid pulses. Distal extremity pulses: 2+ bilaterally. Neurological: Alertness and orientation as per Constitutional exam. No evidence of gross cranial nerve deficit. Coordination appeared normal.   Skin: Skin is warm and dry. There is no rash or diaphoresis. Psychiatric: She has a normal mood and affect. Her speech is normal and behavior is normal.      MOST RECENT LABS ON RECORD:   Lab Results   Component Value Date    WBC 8.0 07/24/2019    HGB 13.0 07/24/2019    HCT 42.2 07/24/2019     07/24/2019    CHOL 182 01/11/2017    TRIG 69 01/11/2017    HDL 83 01/11/2017    ALT 19 01/11/2017    AST 23 01/11/2017     07/24/2019    K 4.0 07/24/2019     07/24/2019    CREATININE 0.69 07/24/2019    BUN 21 (H) 07/24/2019    CO2 26 07/24/2019    TSH 2.59 07/25/2019    LABA1C 5.4 01/11/2017       ASSESSMENT:     1. PAF (paroxysmal atrial fibrillation) ([facility] [address]    2. Morbid obesity ([facility] [address]       PLAN:        History of Paroxysmal Atrial Fibrillation: Currently maintaining sinus rhythm. Currently asymptomatic. Beta Blocker Therapy: Continue Metoprolol succinate (Toprol XL)  25 mg  daily I discussed the potential side effects of this medication including lightheadedness and dizziness and told her to call the office if this occurs. CHADS-Vasc score is 0, continue aspirin. Morbid obesity: Body mass index is 40.57 kg/m².    I also briefly discussed both diet and exercise strategies for her to continue to loses weight and she was very receptive to this. History of obstructive sleep apnea syndrome: Not interested in sleep study at this point. She is trying to lose weight to improve her quality of life and sleep quality. In the meantime, I encouraged Ms. Watkins to continue to take her other medications. FOLLOW UP:   I told Ms. Watkins to call my office if she had any problems, but otherwise I asked her to Return in about 1 year (around 3/31/2022). However, I would be happy to see her sooner should the need arise. Sincerely,  Trena Gil MD, F.A.C.C. [facility] Cardiology Specialist    [address] [place] [address], [address] [place] [address]  Phone: [phone], Fax: [phone]     I believe that the risk of significant morbidity and mortality related to the patient's current medical conditions are: low-intermediate. >15 minutes were spent during prep work, discussion and exam of the patient, and follow up documentation and all of their questions were answered. The documentation recorded by the scribe, accurately and completely reflects the services I personally performed and the decisions made by me. Trena Gil MD, F.A.C.C.  March 31, 2021

## 2021-03-31 NOTE — PATIENT INSTRUCTIONS
SURVEY:    You may be receiving a survey from [employer] regarding your visit today. Please complete the survey to enable us to provide the highest quality of care to you and your family. If you cannot score us a very good on any question, please call the office to discuss how we could have made your experience a very good one. Thank you.

## 2021-05-10 ENCOUNTER — HOSPITAL ENCOUNTER (OUTPATIENT)
Dept: INFUSION THERAPY | Age: 56
End: 2021-05-10
Payer: COMMERCIAL

## 2021-05-17 ENCOUNTER — HOSPITAL ENCOUNTER (OUTPATIENT)
Dept: INFUSION THERAPY | Age: 56
Discharge: HOME | End: 2021-05-17
Payer: COMMERCIAL

## 2021-05-17 VITALS
RESPIRATION RATE: 20 BRPM | HEART RATE: 51 BPM | SYSTOLIC BLOOD PRESSURE: 108 MMHG | TEMPERATURE: 96.8 F | DIASTOLIC BLOOD PRESSURE: 61 MMHG

## 2021-05-17 DIAGNOSIS — M81.0 OSTEOPOROSIS, UNSPECIFIED OSTEOPOROSIS TYPE, UNSPECIFIED PATHOLOGICAL FRACTURE PRESENCE: Primary | ICD-10-CM

## 2021-05-17 PROCEDURE — 96372 THER/PROPH/DIAG INJ SC/IM: CPT

## 2021-05-17 PROCEDURE — 6360000002 HC RX W HCPCS: Performed by: NURSE PRACTITIONER

## 2021-05-17 RX ORDER — DENOSUMAB 60 MG/ML
60 INJECTION SUBCUTANEOUS ONCE
COMMUNITY

## 2021-05-17 RX ORDER — CHLORTHALIDONE 25 MG/1
25 TABLET ORAL DAILY
COMMUNITY
End: 2021-09-23

## 2021-05-17 RX ADMIN — DENOSUMAB 60 MG: 60 INJECTION SUBCUTANEOUS at 10:32

## 2021-08-30 ENCOUNTER — HOSPITAL ENCOUNTER (OUTPATIENT)
Age: 56
Setting detail: SPECIMEN
Discharge: HOME | End: 2021-08-30
Payer: COMMERCIAL

## 2021-08-30 LAB
-: NORMAL
AMORPHOUS: NORMAL
BACTERIA: NORMAL
BILIRUBIN URINE: NEGATIVE
CASTS UA: NORMAL /LPF
COLOR: YELLOW
COMMENT UA: ABNORMAL
CRYSTALS, UA: NORMAL /HPF
EPITHELIAL CELLS UA: NORMAL /HPF (ref 0–25)
GLUCOSE URINE: NEGATIVE
KETONES, URINE: NEGATIVE
LEUKOCYTE ESTERASE, URINE: NEGATIVE
MUCUS: NORMAL
NITRITE, URINE: NEGATIVE
OTHER OBSERVATIONS UA: NORMAL
PH UA: 7 (ref 5–9)
PROTEIN UA: NEGATIVE
RBC UA: NORMAL /HPF (ref 0–2)
RENAL EPITHELIAL, UA: NORMAL /HPF
SPECIFIC GRAVITY UA: 1.01 (ref 1.01–1.02)
TRICHOMONAS: NORMAL
TURBIDITY: ABNORMAL
URINE HGB: NEGATIVE
UROBILINOGEN, URINE: NORMAL
WBC UA: NORMAL /HPF (ref 0–5)
YEAST: NORMAL

## 2021-08-30 PROCEDURE — 87086 URINE CULTURE/COLONY COUNT: CPT

## 2021-08-30 PROCEDURE — 81001 URINALYSIS AUTO W/SCOPE: CPT

## 2021-09-01 LAB
CULTURE: NORMAL
Lab: NORMAL
SPECIMEN DESCRIPTION: NORMAL

## 2021-09-10 ENCOUNTER — HOSPITAL ENCOUNTER (OUTPATIENT)
Dept: CT IMAGING | Age: 56
Discharge: HOME | End: 2021-09-12
Payer: COMMERCIAL

## 2021-09-10 DIAGNOSIS — R10.9 RT FLANK PAIN: ICD-10-CM

## 2021-09-10 PROCEDURE — 74176 CT ABD & PELVIS W/O CONTRAST: CPT

## 2021-09-23 ENCOUNTER — HOSPITAL ENCOUNTER (OUTPATIENT)
Age: 56
Setting detail: SPECIMEN
Discharge: HOME | End: 2021-09-23
Payer: COMMERCIAL

## 2021-09-23 ENCOUNTER — OFFICE VISIT (OUTPATIENT)
Dept: UROLOGY | Age: 56
End: 2021-09-23
Payer: COMMERCIAL

## 2021-09-23 VITALS
DIASTOLIC BLOOD PRESSURE: 72 MMHG | HEART RATE: 63 BPM | WEIGHT: 221 LBS | SYSTOLIC BLOOD PRESSURE: 102 MMHG | HEIGHT: 62 IN | BODY MASS INDEX: 40.67 KG/M2

## 2021-09-23 DIAGNOSIS — N95.2 VAGINAL ATROPHY: ICD-10-CM

## 2021-09-23 DIAGNOSIS — R39.16 STRAINING DURING URINATION: Primary | ICD-10-CM

## 2021-09-23 DIAGNOSIS — N89.8 VAGINAL DISCHARGE: ICD-10-CM

## 2021-09-23 DIAGNOSIS — R39.16 STRAINING DURING URINATION: ICD-10-CM

## 2021-09-23 DIAGNOSIS — N94.10 DYSPAREUNIA IN FEMALE: ICD-10-CM

## 2021-09-23 LAB
-: ABNORMAL
AMORPHOUS: ABNORMAL
BACTERIA: ABNORMAL
BILIRUBIN URINE: NEGATIVE
CASTS UA: ABNORMAL /LPF
COLOR: YELLOW
COMMENT UA: ABNORMAL
CRYSTALS, UA: ABNORMAL /HPF
DIRECT EXAM: NORMAL
EPITHELIAL CELLS UA: ABNORMAL /HPF (ref 0–25)
GLUCOSE URINE: NEGATIVE
KETONES, URINE: NEGATIVE
LEUKOCYTE ESTERASE, URINE: NEGATIVE
Lab: NORMAL
MUCUS: ABNORMAL
NITRITE, URINE: NEGATIVE
OTHER OBSERVATIONS UA: ABNORMAL
PH UA: 6 (ref 5–9)
PROTEIN UA: NEGATIVE
RBC UA: ABNORMAL /HPF (ref 0–2)
RENAL EPITHELIAL, UA: ABNORMAL /HPF
SPECIFIC GRAVITY UA: 1.02 (ref 1.01–1.02)
SPECIMEN DESCRIPTION: NORMAL
TRICHOMONAS: ABNORMAL
TURBIDITY: CLEAR
URINE HGB: NEGATIVE
UROBILINOGEN, URINE: NORMAL
WBC UA: ABNORMAL /HPF (ref 0–5)
YEAST: ABNORMAL

## 2021-09-23 PROCEDURE — 87210 SMEAR WET MOUNT SALINE/INK: CPT

## 2021-09-23 PROCEDURE — 99204 OFFICE O/P NEW MOD 45 MIN: CPT | Performed by: NURSE PRACTITIONER

## 2021-09-23 PROCEDURE — 87086 URINE CULTURE/COLONY COUNT: CPT

## 2021-09-23 PROCEDURE — 51798 US URINE CAPACITY MEASURE: CPT | Performed by: NURSE PRACTITIONER

## 2021-09-23 PROCEDURE — 81001 URINALYSIS AUTO W/SCOPE: CPT

## 2021-09-23 RX ORDER — TAMSULOSIN HYDROCHLORIDE 0.4 MG/1
CAPSULE ORAL
COMMUNITY
Start: 2021-08-30 | End: 2021-09-23 | Stop reason: ALTCHOICE

## 2021-09-23 ASSESSMENT — ENCOUNTER SYMPTOMS
EYE REDNESS: 0
APNEA: 0
COLOR CHANGE: 0
WHEEZING: 0
COUGH: 0
BACK PAIN: 0
CONSTIPATION: 0
NAUSEA: 0
ABDOMINAL PAIN: 0
SHORTNESS OF BREATH: 0
VOMITING: 0

## 2021-09-23 NOTE — PATIENT INSTRUCTIONS
Estrogen cream:  Place a pea-sized amount vaginally nightly x 2 weeks, then use 3 nights per week thereafter. Do NOT use plastic applicator.

## 2021-09-23 NOTE — PROGRESS NOTES
HPI:        Patient is a 61 y.o. female in no acute distress. She is alert and oriented to person, place, and time. New patient referral from DUYEN NESS for difficulty with urination. Since August she has developed suprapubic pressure and straining with urination. She has also developed nocturia 3-5 times per night and frequency every 2 hours. She did not have any issues with frequency or nocturia prior to August.  She was started on Flomax and this did help her urinate more effectively, but she does have dribbling since starting the Flomax. PVR is low, 57 mL. She does have daily bowel movements. She denies any dysuria or gross hematuria. She denies any history of stones. She did have a SLIME in her 45s. She has had longstanding dyspareunia. She has been prescribed estrogen cream in the past, but does not consistently use this due to hot flashes. She is using the plastic applicator. She did have a CT abdomen and pelvis completed. This is not available for independent review, but the CT was negative for any  abnormalities. Past Medical History:   Diagnosis Date    Anemia     Chronic back pain     Depression     GERD (gastroesophageal reflux disease)     History of cardiac monitoring (CAM) 2019    Sinus rhythm with 2% PACs and a 13 short runs of atrial tachycardia, the longest being 10 beats at HR of 175 bpm. No significant ventricular arrhythmia.  History of stress test 2019    Largely normal myocardical perfusion. Low risk for significant CAD.     Nontoxic multinodular goiter     Osteoarthritis      Past Surgical History:   Procedure Laterality Date     SECTION      COLONOSCOPY  14    Dr. Tanya Navarro, TOTAL ABDOMINAL      TUBAL LIGATION       Outpatient Encounter Medications as of 2021   Medication Sig Dispense Refill    denosumab (PROLIA) 60 MG/ML SOSY SC injection Inject 60 mg into the skin once Every 6 months      Ascorbic Acid (VITAMIN C) 250 MG tablet Take 250 mg by mouth daily      metoprolol succinate (TOPROL XL) 25 MG extended release tablet TAKE 1 TABLET BY MOUTH EVERY DAY 90 tablet 3    vitamin D (CHOLECALCIFEROL) 25 MCG (1000 UT) TABS tablet Take 1,000 Units by mouth daily      zinc 50 MG TABS tablet Take 50 mg by mouth daily      Ferrous Gluconate (IRON 27 PO) Take by mouth daily      vitamin B-12 (CYANOCOBALAMIN) 500 MCG tablet Take 500 mcg by mouth daily      estradiol (ESTRACE VAGINAL) 0.1 MG/GM vaginal cream Place 1 g vaginally Twice a Week Times 2 weeks then apply 2-3 times/week 1 Tube 3    pantoprazole (PROTONIX) 40 MG tablet TAKE 1 TABLET ONCE A DAY ORALLY 90 tablet 3    meloxicam (MOBIC) 15 MG tablet Take 15 mg by mouth daily       aspirin  MG EC tablet Take 1 tablet by mouth daily. (Patient taking differently: Take 81 mg by mouth daily ) 30 tablet 3    Multiple Vitamins-Minerals (MULTIVITAMIN & MINERAL PO) Take 1 tablet by mouth daily. Takes Denisse-fusion      calcium-vitamin D (OSCAL-500) 500-200 MG-UNIT per tablet Take 1 tablet by mouth daily.  tamsulosin (FLOMAX) 0.4 MG capsule TAKE 1 CAPSULE BY MOUTH EVERY DAY (Patient not taking: Reported on 9/23/2021)      chlorthalidone (HYGROTON) 25 MG tablet Take 25 mg by mouth daily (Patient not taking: Reported on 9/23/2021)       No facility-administered encounter medications on file as of 9/23/2021.       Current Outpatient Medications on File Prior to Visit   Medication Sig Dispense Refill    denosumab (PROLIA) 60 MG/ML SOSY SC injection Inject 60 mg into the skin once Every 6 months      Ascorbic Acid (VITAMIN C) 250 MG tablet Take 250 mg by mouth daily      metoprolol succinate (TOPROL XL) 25 MG extended release tablet TAKE 1 TABLET BY MOUTH EVERY DAY 90 tablet 3    vitamin D (CHOLECALCIFEROL) 25 MCG (1000 UT) TABS tablet Take 1,000 Units by mouth daily      zinc 50 MG TABS tablet Take 50 mg by mouth daily      Ferrous Gluconate (IRON 27 PO) Take by mouth daily      vitamin B-12 (CYANOCOBALAMIN) 500 MCG tablet Take 500 mcg by mouth daily      estradiol (ESTRACE VAGINAL) 0.1 MG/GM vaginal cream Place 1 g vaginally Twice a Week Times 2 weeks then apply 2-3 times/week 1 Tube 3    pantoprazole (PROTONIX) 40 MG tablet TAKE 1 TABLET ONCE A DAY ORALLY 90 tablet 3    meloxicam (MOBIC) 15 MG tablet Take 15 mg by mouth daily       aspirin  MG EC tablet Take 1 tablet by mouth daily. (Patient taking differently: Take 81 mg by mouth daily ) 30 tablet 3    Multiple Vitamins-Minerals (MULTIVITAMIN & MINERAL PO) Take 1 tablet by mouth daily. Takes Denisse-fusion      calcium-vitamin D (OSCAL-500) 500-200 MG-UNIT per tablet Take 1 tablet by mouth daily.  tamsulosin (FLOMAX) 0.4 MG capsule TAKE 1 CAPSULE BY MOUTH EVERY DAY (Patient not taking: Reported on 9/23/2021)      chlorthalidone (HYGROTON) 25 MG tablet Take 25 mg by mouth daily (Patient not taking: Reported on 9/23/2021)       No current facility-administered medications on file prior to visit. Hydrocodone-acetaminophen, Codeine, and Reclast [zoledronic acid]  Family History   Problem Relation Age of Onset    High Cholesterol Mother     Heart Disease Father     Diabetes Brother      Social History     Tobacco Use   Smoking Status Former Smoker    Packs/day: 2.00    Years: 20.00    Pack years: 40.00    Types: Cigarettes    Quit date: 3/13/2006    Years since quitting: 15.5   Smokeless Tobacco Never Used       Social History     Substance and Sexual Activity   Alcohol Use Yes    Alcohol/week: 0.0 standard drinks    Comment: rarely       Review of Systems   Constitutional: Negative for appetite change, chills and fever. Eyes: Negative for redness and visual disturbance. Respiratory: Negative for apnea, cough, shortness of breath and wheezing. Cardiovascular: Negative for chest pain and leg swelling. Gastrointestinal: Negative for abdominal pain, constipation, nausea and vomiting. Genitourinary: Positive for difficulty urinating, dyspareunia and frequency. Negative for dysuria, enuresis, flank pain, hematuria, pelvic pain, urgency, vaginal bleeding and vaginal discharge. Musculoskeletal: Negative for back pain, joint swelling and myalgias. Skin: Negative for color change, rash and wound. Neurological: Negative for dizziness, tremors and numbness. Hematological: Negative for adenopathy. Does not bruise/bleed easily. Psychiatric/Behavioral: Negative for sleep disturbance. /72 (Site: Right Upper Arm, Position: Sitting, Cuff Size: Large Adult)   Pulse 63   Ht 5' 2\" (1.575 m)   Wt 221 lb (100.2 kg)   BMI 40.42 kg/m²       PHYSICAL EXAM:  Constitutional: Patient resting comfortably, in no acute distress. Neuro: Alert and oriented to person place and time. Cranial nerves grossly intact. Psych: Mood and affect normal.  Skin: Warm, dry  HEENT: normocephalic, atraumatic  Lymphatics: No palpable lymphadenopathy  Lungs: Respiratory effort normal, unlabored  Cardiovascular:  Normal peripheral pulses  Abdomen: Soft, non-tender, non-distended with no organomegaly or palpable masses. : No CVA tenderness. Bladder non-tender and not distended. Pelvic: Vaginal atrophy with narrowing of the vaginal introitus and urethra. Thin milky white vaginal and urethral discharge. Lab Results   Component Value Date    BUN 21 (H) 07/24/2019     Lab Results   Component Value Date    CREATININE 0.69 07/24/2019       ASSESSMENT:   Diagnosis Orders   1. Straining during urination  [place] MEASUREMENT,POST-VOID RESIDUAL VOLUME BY US,NON-IMAGING    Urinalysis with Microscopic    Culture, Urine   2. Vaginal atrophy     3. Dyspareunia in female     4. Vaginal discharge  Wet prep, genital           PLAN:    We will check a UA C&S    She will stop Flomax    I do feel that her symptoms are related to vaginal atrophy. She will stop using the plastic applicator to apply the vaginal estrogen. Place a pea-sized amount vaginally nightly x 2 weeks, then use 3 nights per week thereafter. Do NOT use plastic applicator. If she does develop hot flashes we can change her to a compounded cream.    We will see her back in 6 to 8 weeks for pelvic exam.  I did explain that it can take 3 to 6 months for the cream to become effective.

## 2021-09-24 LAB
CULTURE: NO GROWTH
Lab: NORMAL
SPECIMEN DESCRIPTION: NORMAL

## 2021-09-27 ENCOUNTER — TELEPHONE (OUTPATIENT)
Dept: UROLOGY | Age: 56
End: 2021-09-27

## 2021-09-27 NOTE — TELEPHONE ENCOUNTER
----- Message from THI Steele - CNP sent at 9/27/2021  9:10 AM EDT -----  Call pt - urine cx reviewed and negative for UTI & for significant microhematuria. Vaginal culture negative.

## 2021-09-27 NOTE — RESULT ENCOUNTER NOTE
Call pt - urine cx reviewed and negative for UTI & for significant microhematuria. Vaginal culture negative.

## 2021-11-22 ENCOUNTER — HOSPITAL ENCOUNTER (OUTPATIENT)
Dept: INFUSION THERAPY | Age: 56
End: 2021-11-22
Payer: COMMERCIAL

## 2021-11-22 RX ORDER — ONDANSETRON 2 MG/ML
8 INJECTION INTRAMUSCULAR; INTRAVENOUS
OUTPATIENT
Start: 2021-11-22

## 2021-11-22 RX ORDER — ACETAMINOPHEN 325 MG/1
650 TABLET ORAL
OUTPATIENT
Start: 2021-11-22

## 2021-11-22 RX ORDER — EPINEPHRINE 1 MG/ML
0.3 INJECTION, SOLUTION, CONCENTRATE INTRAVENOUS PRN
OUTPATIENT
Start: 2021-11-22

## 2021-11-22 RX ORDER — ALBUTEROL SULFATE 90 UG/1
4 AEROSOL, METERED RESPIRATORY (INHALATION) PRN
OUTPATIENT
Start: 2021-11-22

## 2021-11-22 RX ORDER — DIPHENHYDRAMINE HYDROCHLORIDE 50 MG/ML
50 INJECTION INTRAMUSCULAR; INTRAVENOUS
OUTPATIENT
Start: 2021-11-22

## 2021-11-22 RX ORDER — SODIUM CHLORIDE 9 MG/ML
INJECTION, SOLUTION INTRAVENOUS CONTINUOUS
OUTPATIENT
Start: 2021-11-22

## 2021-11-29 ENCOUNTER — HOSPITAL ENCOUNTER (OUTPATIENT)
Dept: INFUSION THERAPY | Age: 56
Discharge: HOME | End: 2021-11-29
Payer: COMMERCIAL

## 2021-11-29 VITALS
SYSTOLIC BLOOD PRESSURE: 107 MMHG | DIASTOLIC BLOOD PRESSURE: 66 MMHG | HEART RATE: 66 BPM | TEMPERATURE: 96.8 F | RESPIRATION RATE: 18 BRPM

## 2021-11-29 DIAGNOSIS — M81.0 OSTEOPOROSIS, UNSPECIFIED OSTEOPOROSIS TYPE, UNSPECIFIED PATHOLOGICAL FRACTURE PRESENCE: Primary | ICD-10-CM

## 2021-11-29 PROCEDURE — 96372 THER/PROPH/DIAG INJ SC/IM: CPT

## 2021-11-29 PROCEDURE — 6360000002 HC RX W HCPCS: Performed by: NURSE PRACTITIONER

## 2021-11-29 RX ORDER — EPINEPHRINE 1 MG/ML
0.3 INJECTION, SOLUTION, CONCENTRATE INTRAVENOUS PRN
OUTPATIENT
Start: 2022-05-30

## 2021-11-29 RX ORDER — DIPHENHYDRAMINE HYDROCHLORIDE 50 MG/ML
50 INJECTION INTRAMUSCULAR; INTRAVENOUS
OUTPATIENT
Start: 2022-05-30

## 2021-11-29 RX ORDER — ALBUTEROL SULFATE 90 UG/1
4 AEROSOL, METERED RESPIRATORY (INHALATION) PRN
OUTPATIENT
Start: 2022-05-30

## 2021-11-29 RX ORDER — SODIUM CHLORIDE 9 MG/ML
INJECTION, SOLUTION INTRAVENOUS CONTINUOUS
OUTPATIENT
Start: 2022-05-30

## 2021-11-29 RX ORDER — ACETAMINOPHEN 325 MG/1
650 TABLET ORAL
OUTPATIENT
Start: 2022-05-30

## 2021-11-29 RX ORDER — ONDANSETRON 2 MG/ML
8 INJECTION INTRAMUSCULAR; INTRAVENOUS
OUTPATIENT
Start: 2022-05-30

## 2021-11-29 RX ADMIN — DENOSUMAB 60 MG: 60 INJECTION SUBCUTANEOUS at 11:49

## 2022-02-24 DIAGNOSIS — I48.0 PAF (PAROXYSMAL ATRIAL FIBRILLATION) (HCC): ICD-10-CM

## 2022-02-24 DIAGNOSIS — E66.01 MORBID OBESITY (HCC): ICD-10-CM

## 2022-02-24 RX ORDER — METOPROLOL SUCCINATE 25 MG/1
TABLET, EXTENDED RELEASE ORAL
Qty: 90 TABLET | Refills: 3 | Status: SHIPPED | OUTPATIENT
Start: 2022-02-24

## 2022-04-04 ENCOUNTER — OFFICE VISIT (OUTPATIENT)
Dept: CARDIOLOGY | Age: 57
End: 2022-04-04
Payer: COMMERCIAL

## 2022-04-04 VITALS
WEIGHT: 233.8 LBS | OXYGEN SATURATION: 96 % | SYSTOLIC BLOOD PRESSURE: 114 MMHG | RESPIRATION RATE: 18 BRPM | DIASTOLIC BLOOD PRESSURE: 77 MMHG | HEART RATE: 58 BPM | HEIGHT: 62 IN | BODY MASS INDEX: 43.02 KG/M2

## 2022-04-04 DIAGNOSIS — G47.33 OSA (OBSTRUCTIVE SLEEP APNEA): ICD-10-CM

## 2022-04-04 DIAGNOSIS — E66.01 MORBID OBESITY (HCC): ICD-10-CM

## 2022-04-04 DIAGNOSIS — I48.0 PAF (PAROXYSMAL ATRIAL FIBRILLATION) (HCC): Primary | ICD-10-CM

## 2022-04-04 PROCEDURE — 93000 ELECTROCARDIOGRAM COMPLETE: CPT | Performed by: INTERNAL MEDICINE

## 2022-04-04 PROCEDURE — 99213 OFFICE O/P EST LOW 20 MIN: CPT | Performed by: INTERNAL MEDICINE

## 2022-04-04 RX ORDER — CHLORTHALIDONE 25 MG/1
TABLET ORAL EVERY OTHER DAY
COMMUNITY
Start: 2022-03-21

## 2022-04-04 NOTE — PATIENT INSTRUCTIONS
SURVEY:    You may be receiving a survey from [employer] regarding your visit today. Please complete the survey to enable us to provide the highest quality of care to you and your family. If you cannot score us a very good on any question, please call the office to discuss how we could have made your experience a very good one. Thank you.

## 2022-04-04 NOTE — PROGRESS NOTES
I, Jacque Gibbons am scribing for and in the presence of Chinyere Mcduffie MD, F.A.C.C..    Patient: Michael Bustillos  : 1965  Date of Visit: 2022    REASON FOR VISIT / CONSULTATION: 1 Year Follow Up (EKG done today. HX: PAF, obese Pt is here for yearly check up she states she is doing well has occasional rare palp Denies:CP,SOB,lightheaded/dizziness)      History of Present Illness:        Dear Caity Sapp had the pleasure of seeing Ms. Watkins today who is a 61 y.o. female who presents for follow-up. Past cardiac medical history consist of arriving at the emergency room on 3/12/2015 with new onset of atrial fibrillation with rapid ventricular response. The onset of her atrial fibrillation was less than 24 hours, so medical cardioversion using Propafenone was used and patient converted to normal sinus rhythm. Echo 3-, Relatively normal 2D echocardiogram with Doppler imaging. Global left ventricular systolic function appears preserved with an estimated ejection fraction of 65%. The left ventricular cavity size is within normal limits and the left ventricular wall thickness is within normal limits. No definite specific wall motion abnormalities were identified. No significant valvular abnormalities. No clear evidence of diastolic dysfunction is seen.     Stress test 3/24/2015,  Exercise duration 7 minutes No chest pain or ischemic ECG changes during stress. Low risk for coronary artery disease. Is done very well since that time. She followed up with Genet Burris     EKG in office on 2019-sinus rhythm with PACs, no ischemic changes. Stress Test on 2019: Largely normal myocardical perfusion. Low risk for significant CAD. ECG done on 2019 showed sinus rhythm, QRS duration is normal QT interval is normal.  No acute ischemic changes.     7 Day Cardiac Monitoring on 2019: Sinus rhythm with 2% PACs and a 13 short runs of atrial tachycardia, the longest being 10 beats at HR of 175 bpm. No significant ventricular arrhythmia. EKG done in office on 3/31/2021: Sinus bradycardia. No acute ischemic changes. Ms. Marvin Dukes is here for a yearly follow up today. She has been doing fairly well cardiac wise. She occasionally  palpitations, maybe once a month. She has not followed up on her sleep apnea. She does little physical activity she is much busier in summer. She reports her sleeping is good. She denies any chest pain, pressure or tightness. She denies having any shortness of breath. No abdominal pain, nausea or vomiting. No cough, fever or chills. No bleeding problems, bowel issues, problems with medications or any other concerns at this time. EKG done today in office 2022- Sinus Bradycardia. No acute ischemic changes. PAST MEDICAL HISTORY:        Past Medical History:   Diagnosis Date    Anemia     Chronic back pain     Depression     GERD (gastroesophageal reflux disease)     History of cardiac monitoring (CAM) 2019    Sinus rhythm with 2% PACs and a 13 short runs of atrial tachycardia, the longest being 10 beats at HR of 175 bpm. No significant ventricular arrhythmia.  History of stress test 2019    Largely normal myocardical perfusion. Low risk for significant CAD.  Nontoxic multinodular goiter     Osteoarthritis        CURRENT ALLERGIES: Hydrocodone-acetaminophen, Codeine, and Reclast [zoledronic acid] REVIEW OF SYSTEMS: 14 systems were reviewed. Pertinent positives and negatives as above, all else negative.      Past Surgical History:   Procedure Laterality Date     SECTION      COLONOSCOPY  14    Dr. Gerson Mchugh, TOTAL ABDOMINAL      TUBAL LIGATION      Social History:  Social History     Tobacco Use    Smoking status: Former Smoker     Packs/day: 2.00     Years: 20.00     Pack years: 40.00     Types: Cigarettes Quit date: 3/13/2006     Years since quittin.0    Smokeless tobacco: Never Used   Substance Use Topics    Alcohol use: Yes     Alcohol/week: 0.0 standard drinks     Comment: rarely    Drug use: No        CURRENT MEDICATIONS:        Outpatient Medications Marked as Taking for the 22 encounter (Office Visit) with Matthew Armando MD   Medication Sig Dispense Refill    chlorthalidone (HYGROTON) 25 MG tablet every other day       metoprolol succinate (TOPROL XL) 25 MG extended release tablet TAKE 1 TABLET BY MOUTH EVERY DAY 90 tablet 3    denosumab (PROLIA) 60 MG/ML SOSY SC injection Inject 60 mg into the skin once Every 6 months      Ascorbic Acid (VITAMIN C) 250 MG tablet Take 250 mg by mouth daily      vitamin D (CHOLECALCIFEROL) 25 MCG (1000 UT) TABS tablet Take 1,000 Units by mouth daily      zinc 50 MG TABS tablet Take 50 mg by mouth daily      Ferrous Gluconate (IRON 27 PO) Take by mouth daily Every other day      vitamin B-12 (CYANOCOBALAMIN) 500 MCG tablet Take 500 mcg by mouth daily      estradiol (ESTRACE VAGINAL) 0.1 MG/GM vaginal cream Place 1 g vaginally Twice a Week Times 2 weeks then apply 2-3 times/week 1 Tube 3    pantoprazole (PROTONIX) 40 MG tablet TAKE 1 TABLET ONCE A DAY ORALLY (Patient taking differently: Every other day) 90 tablet 3    meloxicam (MOBIC) 15 MG tablet Take 15 mg by mouth daily       aspirin  MG EC tablet Take 1 tablet by mouth daily. (Patient taking differently: Take 81 mg by mouth daily ) 30 tablet 3    Multiple Vitamins-Minerals (MULTIVITAMIN & MINERAL PO) Take 1 tablet by mouth daily. Takes Denisse-fusion      calcium-vitamin D (OSCAL-500) 500-200 MG-UNIT per tablet Take 1 tablet by mouth daily. FAMILY HISTORY: family history includes Diabetes in her brother; Heart Disease in her father; High Cholesterol in her mother.      Physical Examination:     /77 (Site: Left Upper Arm, Position: Sitting, Cuff Size: Large Adult)   Pulse 58   Resp 18   Ht 5' 2\" (1.575 m)   Wt 233 lb 12.8 oz (106.1 kg)   SpO2 96%   BMI 42.76 kg/m²  Body mass index is 42.76 kg/m². Constitutional: She appeared oriented to person and place. She appears well-developed and well-nourished. In no acute distress. HEENT: Normocephalic and atraumatic. No JVD present. Carotid bruit is not present. No mass and no thyromegaly present. No lymphadenopathy noted. Cardiovascular: Normal rate, regular rhythm, normal heart sounds. Exam reveals no gallop and no friction rubs. No murmur was heard. Pulmonary/Chest: Effort normal and breath sounds normal. No respiratory distress. She has no wheezes, rhonchi or rales. Abdominal: Soft, non-tender. She exhibits no organomegaly, mass or bruit. Extremities: No significant edema. No cyanosis or clubbing. 2+ radial and carotid pulses. Distal extremity pulses: 2+ bilaterally. Neurological: Alertness and orientation as per Constitutional exam. No evidence of gross cranial nerve deficit. Coordination appeared normal.   Skin: Skin is warm and dry. There is no rash or diaphoresis. Psychiatric: She has a normal mood and affect. Her speech is normal and behavior is normal.      MOST RECENT LABS ON RECORD:   Lab Results   Component Value Date    WBC 8.0 07/24/2019    HGB 13.0 07/24/2019    HCT 42.2 07/24/2019     07/24/2019    CHOL 182 01/11/2017    TRIG 69 01/11/2017    HDL 83 01/11/2017    ALT 19 01/11/2017    AST 23 01/11/2017     07/24/2019    K 4.0 07/24/2019     07/24/2019    CREATININE 0.69 07/24/2019    BUN 21 (H) 07/24/2019    CO2 26 07/24/2019    TSH 2.59 07/25/2019    LABA1C 5.4 01/11/2017       ASSESSMENT:     1. PAF (paroxysmal atrial fibrillation) ([facility]    2. Morbid obesity ([facility]    3. DONN (obstructive sleep apnea)       PLAN:        History of Paroxysmal Atrial Fibrillation: Currently maintaining sinus rhythm. one episode of atrial fibrillation back in 2015, no recurrence.   Beta Blocker Therapy: Continue Metoprolol succinate (Toprol XL)  25 mg  daily I discussed the potential side effects of this medication including lightheadedness and dizziness and told her to call the office if this occurs. CHADS-Vasc score is 1, continue aspirin. [id number] Score for Atrial Fibrillation Stroke Risk   Risk   Factors  Component Value   C CHF No 0   H HTN No 0   A2 Age >= 75 No,  (57 y.o.) 0   D DM No 0   S2 Prior Stroke/TIA No 0   V Vascular Disease No 0   A Age 74-69 No,  (57 y.o.) 0   Sc Sex female 1    [id number]  Score  1   Score last updated 4/4/22 8:43 AM EDT  Continue aspirin. She has no history of hypertension, she is taking chlorthalidone because of intermittent lower extremity swelling. Click here for a link to the UpToDate guideline \"Atrial Fibrillation: Anticoagulation therapy to prevent embolization    Disclaimer: Risk Score calculation is dependent on accuracy of patient problem list and past encounter diagnosis. Morbid obesity: Body mass index is 42.76 kg/m². I also briefly discussed both diet and exercise strategies for her to continue to loses weight and she was very receptive to this. History of obstructive Sleep Apnea: Not interested in CPAP therapy. Counseled patient regarding sleep hygiene. Will get lab work from [employer]    In the meantime, I encouraged Ms. Watkins to continue to take her other medications. FOLLOW UP:   I told Ms. Watkins to call my office if she had any problems, but otherwise I asked her to Return in about 1 year (around 4/4/2023). However, I would be happy to see her sooner should the need arise. Sincerely,  Sai Dean MD, F.A.C.C. [address] [facility] Cardiology Specialist    [address] [place] [address] [address], [address] [facility]  Phone: [phone], Fax: [phone]     I believe that the risk of significant morbidity and mortality related to the patient's current medical conditions are: low-intermediate.  Approximately 25 minutes were spent during prep work, discussion and exam of the patient, and follow up documentation and all of their questions were answered. The documentation recorded by the scribe, accurately and completely reflects the services I personally performed and the decisions made by me. Yeyo Chandler MD, F.A.C.C.  April 4, 2022

## 2022-06-02 ENCOUNTER — HOSPITAL ENCOUNTER (OUTPATIENT)
Dept: INFUSION THERAPY | Age: 57
Discharge: HOME | End: 2022-06-02

## 2022-12-21 ENCOUNTER — HOSPITAL ENCOUNTER (OUTPATIENT)
Dept: WOMENS IMAGING | Age: 57
Discharge: HOME | End: 2022-12-23
Payer: COMMERCIAL

## 2022-12-21 DIAGNOSIS — M81.0 AGE-RELATED OSTEOPOROSIS WITHOUT CURRENT PATHOLOGICAL FRACTURE: ICD-10-CM

## 2022-12-21 PROCEDURE — 77080 DXA BONE DENSITY AXIAL: CPT

## 2023-02-12 DIAGNOSIS — I48.0 PAF (PAROXYSMAL ATRIAL FIBRILLATION) (HCC): ICD-10-CM

## 2023-02-12 DIAGNOSIS — E66.01 MORBID OBESITY (HCC): ICD-10-CM

## 2023-02-13 RX ORDER — METOPROLOL SUCCINATE 25 MG/1
TABLET, EXTENDED RELEASE ORAL
Qty: 90 TABLET | Refills: 3 | Status: SHIPPED | OUTPATIENT
Start: 2023-02-13

## 2023-11-13 ENCOUNTER — HOSPITAL ENCOUNTER (OUTPATIENT)
Dept: WOMENS IMAGING | Age: 58
Discharge: HOME | End: 2023-11-15
Payer: COMMERCIAL

## 2023-11-13 VITALS — HEIGHT: 62 IN | BODY MASS INDEX: 45.64 KG/M2 | WEIGHT: 248 LBS

## 2023-11-13 DIAGNOSIS — Z12.31 BREAST CANCER SCREENING BY MAMMOGRAM: ICD-10-CM

## 2023-11-13 PROCEDURE — 77063 BREAST TOMOSYNTHESIS BI: CPT

## 2024-01-06 DIAGNOSIS — I48.0 PAF (PAROXYSMAL ATRIAL FIBRILLATION) (HCC): ICD-10-CM

## 2024-01-06 DIAGNOSIS — E66.01 MORBID OBESITY (HCC): ICD-10-CM

## 2024-01-08 RX ORDER — METOPROLOL SUCCINATE 25 MG/1
TABLET, EXTENDED RELEASE ORAL
Qty: 90 TABLET | Refills: 3 | Status: SHIPPED | OUTPATIENT
Start: 2024-01-08

## 2024-12-12 ENCOUNTER — OFFICE VISIT (OUTPATIENT)
Dept: CARDIOLOGY | Age: 59
End: 2024-12-12
Payer: COMMERCIAL

## 2024-12-12 ENCOUNTER — HOSPITAL ENCOUNTER (OUTPATIENT)
Age: 59
Discharge: HOME | End: 2024-12-14
Payer: COMMERCIAL

## 2024-12-12 ENCOUNTER — TELEPHONE (OUTPATIENT)
Dept: CARDIOLOGY | Age: 59
End: 2024-12-12

## 2024-12-12 VITALS
HEIGHT: 61 IN | DIASTOLIC BLOOD PRESSURE: 68 MMHG | BODY MASS INDEX: 46.83 KG/M2 | SYSTOLIC BLOOD PRESSURE: 107 MMHG | WEIGHT: 248.02 LBS

## 2024-12-12 VITALS
DIASTOLIC BLOOD PRESSURE: 68 MMHG | BODY MASS INDEX: 46.86 KG/M2 | RESPIRATION RATE: 18 BRPM | HEIGHT: 61 IN | SYSTOLIC BLOOD PRESSURE: 107 MMHG | HEART RATE: 48 BPM

## 2024-12-12 DIAGNOSIS — G47.33 OSA (OBSTRUCTIVE SLEEP APNEA): ICD-10-CM

## 2024-12-12 DIAGNOSIS — R00.2 PALPITATIONS: ICD-10-CM

## 2024-12-12 DIAGNOSIS — I48.0 PAROXYSMAL ATRIAL FIBRILLATION (HCC): ICD-10-CM

## 2024-12-12 DIAGNOSIS — I48.0 PAF (PAROXYSMAL ATRIAL FIBRILLATION) (HCC): Primary | ICD-10-CM

## 2024-12-12 DIAGNOSIS — E66.01 MORBID OBESITY: ICD-10-CM

## 2024-12-12 LAB
ECHO AO ASC DIAM: 3.3 CM
ECHO AO ASCENDING AORTA INDEX: 1.59 CM/M2
ECHO AO ROOT DIAM: 2 CM
ECHO AO ROOT INDEX: 0.97 CM/M2
ECHO AO SINUS VALSALVA DIAM: 3.3 CM
ECHO AO SINUS VALSALVA INDEX: 1.59 CM/M2
ECHO AO ST JNCT DIAM: 2.6 CM
ECHO AV CUSP MM: 1.7 CM
ECHO AV MEAN GRADIENT: 3 MMHG
ECHO AV MEAN VELOCITY: 0.8 M/S
ECHO AV PEAK GRADIENT: 6 MMHG
ECHO AV PEAK VELOCITY: 1.3 M/S
ECHO AV VELOCITY RATIO: 1.08
ECHO AV VTI: 31.7 CM
ECHO BSA: 2.2 M2
ECHO BSA: 2.2 M2
ECHO EST RA PRESSURE: 3 MMHG
ECHO LA AREA 2C: 17 CM2
ECHO LA AREA 4C: 19.1 CM2
ECHO LA MAJOR AXIS: 5.2 CM
ECHO LA MINOR AXIS: 5.4 CM
ECHO LA VOL BP: 52 ML (ref 22–52)
ECHO LA VOL MOD A2C: 45 ML (ref 22–52)
ECHO LA VOL MOD A4C: 58 ML (ref 22–52)
ECHO LA VOL/BSA BIPLANE: 25 ML/M2 (ref 16–34)
ECHO LA VOLUME INDEX MOD A2C: 22 ML/M2 (ref 16–34)
ECHO LA VOLUME INDEX MOD A4C: 28 ML/M2 (ref 16–34)
ECHO LV E' LATERAL VELOCITY: 12.9 CM/S
ECHO LV EDV A2C: 70 ML
ECHO LV EDV A4C: 90 ML
ECHO LV EDV INDEX A4C: 43 ML/M2
ECHO LV EDV NDEX A2C: 34 ML/M2
ECHO LV EJECTION FRACTION A2C: 63 %
ECHO LV EJECTION FRACTION A4C: 62 %
ECHO LV EJECTION FRACTION BIPLANE: 62 % (ref 55–100)
ECHO LV ESV A2C: 26 ML
ECHO LV ESV A4C: 35 ML
ECHO LV ESV INDEX A2C: 13 ML/M2
ECHO LV ESV INDEX A4C: 17 ML/M2
ECHO LV FRACTIONAL SHORTENING: 33 % (ref 28–44)
ECHO LV INTERNAL DIMENSION DIASTOLE INDEX: 2.03 CM/M2
ECHO LV INTERNAL DIMENSION DIASTOLIC: 4.2 CM (ref 3.9–5.3)
ECHO LV INTERNAL DIMENSION SYSTOLIC INDEX: 1.35 CM/M2
ECHO LV INTERNAL DIMENSION SYSTOLIC: 2.8 CM
ECHO LV IVSD: 0.9 CM (ref 0.6–0.9)
ECHO LV MASS 2D: 118.7 G (ref 67–162)
ECHO LV MASS INDEX 2D: 57.3 G/M2 (ref 43–95)
ECHO LV POSTERIOR WALL DIASTOLIC: 0.9 CM (ref 0.6–0.9)
ECHO LV RELATIVE WALL THICKNESS RATIO: 0.43
ECHO LVOT AV VTI INDEX: 0.89
ECHO LVOT MEAN GRADIENT: 3 MMHG
ECHO LVOT PEAK GRADIENT: 7 MMHG
ECHO LVOT PEAK VELOCITY: 1.4 M/S
ECHO LVOT VTI: 28.1 CM
ECHO MV A VELOCITY: 0.61 M/S
ECHO MV E DECELERATION TIME (DT): 158 MS
ECHO MV E VELOCITY: 0.78 M/S
ECHO MV E/A RATIO: 1.28
ECHO MV E/E' LATERAL: 6.05
ECHO PULMONARY ARTERY END DIASTOLIC PRESSURE: 3 MMHG
ECHO PV MAX VELOCITY: 0.9 M/S
ECHO PV PEAK GRADIENT: 3 MMHG
ECHO PV REGURGITANT MAX VELOCITY: 0.9 M/S
ECHO RIGHT VENTRICULAR SYSTOLIC PRESSURE (RVSP): 25 MMHG
ECHO TV REGURGITANT MAX VELOCITY: 2.34 M/S
ECHO TV REGURGITANT PEAK GRADIENT: 22 MMHG

## 2024-12-12 PROCEDURE — 93000 ELECTROCARDIOGRAM COMPLETE: CPT | Performed by: INTERNAL MEDICINE

## 2024-12-12 PROCEDURE — 93247 EXT ECG>7D<15D SCAN A/R: CPT

## 2024-12-12 PROCEDURE — 99214 OFFICE O/P EST MOD 30 MIN: CPT | Performed by: PHYSICIAN ASSISTANT

## 2024-12-12 PROCEDURE — 93306 TTE W/DOPPLER COMPLETE: CPT

## 2024-12-12 NOTE — PROGRESS NOTES
01/11/2017     07/24/2019    K 4.0 07/24/2019     07/24/2019    CREATININE 0.69 07/24/2019    BUN 21 (H) 07/24/2019    CO2 26 07/24/2019    TSH 2.59 07/25/2019    LABA1C 5.4 01/11/2017       ASSESSMENT:     1. PAF (paroxysmal atrial fibrillation) (HCC)    2. Palpitations    3. Morbid obesity    4. DONN (obstructive sleep apnea)    5. Paroxysmal atrial fibrillation (HCC)         PLAN:        Assessment & Plan  1. Paroxysmal atrial fibrillation/Palpitations.  She reports occasional palpitations, occurring about once a month, lasting from seconds to hours. EKG today shows sinus bradycardia with a heart rate of 51, which is consistent with her previous readings. She is currently on Toprol XL 25 mg daily for heart rate control. Her CHADS-VASc score remains 1, primarily due to her gender. She is not on any anticoagulation therapy. CMP done in September 2024 showed normal kidney function, liver function, and electrolytes. Hemoglobin is normal. LDL is 86. Vitamin D3 is normal. Hemoglobin A1c is normal. Thyroid function is normal. A CAM monitor will be used for 14 days to investigate the cause of her palpitations. An echocardiogram will be scheduled to assess for any changes in the left atrium. She will restart aspirin 325 mg daily to reduce the risk of stroke. She has been advised to monitor for any signs of bleeding, such as blood in urine or stool, and to apply pressure for 10 minutes if she cuts herself.    2. Morbid obesity.  Diet and exercise were discussed as part of her management plan.    3. Obstructive sleep apnea.  She was not using CPAP therapy at her last appointment and has not had a recent sleep study.    Follow-up  The patient will follow up in 1 year, or earlier if any abnormalities are detected or if her symptoms worsen.    History of Paroxysmal Atrial Fibrillation: Currently maintaining sinus rhythm.  one episode of atrial fibrillation back in 2015, no recurrence.  Beta Blocker Therapy: Continue

## 2024-12-12 NOTE — TELEPHONE ENCOUNTER
----- Message from Bettye Chaudhari PA-C sent at 12/12/2024  1:11 PM EST -----  Please let them know their echo looks good, will discuss at follow up appointment. Thanks.

## 2024-12-18 LAB — ECHO BSA: 2.2 M2

## 2024-12-19 ENCOUNTER — TELEPHONE (OUTPATIENT)
Dept: CARDIOLOGY | Age: 59
End: 2024-12-19

## 2024-12-19 NOTE — TELEPHONE ENCOUNTER
----- Message from Bettye Chaudhari PA-C sent at 12/19/2024  3:59 PM EST -----  Please let them know that their CAM monitor was overall unremarkable. PACs 4% of the study. We will discuss at their follow up appointment.

## 2025-03-04 ENCOUNTER — TRANSCRIBE ORDERS (OUTPATIENT)
Dept: ADMINISTRATIVE | Age: 60
End: 2025-03-04

## 2025-03-04 DIAGNOSIS — R43.9 ALTERED OLFACTORY PERCEPTION: ICD-10-CM

## 2025-03-04 DIAGNOSIS — R68.2 DRY MOUTH: Primary | ICD-10-CM

## 2025-04-03 ENCOUNTER — HOSPITAL ENCOUNTER (OUTPATIENT)
Dept: WOMENS IMAGING | Age: 60
Discharge: HOME | End: 2025-04-05
Payer: COMMERCIAL

## 2025-04-03 VITALS — WEIGHT: 250 LBS | BODY MASS INDEX: 46.01 KG/M2 | HEIGHT: 62 IN

## 2025-04-03 DIAGNOSIS — M81.8 OTHER OSTEOPOROSIS WITHOUT CURRENT PATHOLOGICAL FRACTURE: ICD-10-CM

## 2025-04-03 DIAGNOSIS — Z12.39 SCREENING BREAST EXAMINATION: ICD-10-CM

## 2025-04-03 PROCEDURE — 77080 DXA BONE DENSITY AXIAL: CPT

## 2025-04-03 PROCEDURE — 77063 BREAST TOMOSYNTHESIS BI: CPT

## 2025-04-17 ENCOUNTER — HOSPITAL ENCOUNTER (OUTPATIENT)
Dept: MRI IMAGING | Age: 60
Discharge: HOME | End: 2025-04-19
Payer: COMMERCIAL

## 2025-04-17 DIAGNOSIS — R68.2 DRY MOUTH: ICD-10-CM

## 2025-04-17 DIAGNOSIS — R43.9 ALTERED OLFACTORY PERCEPTION: ICD-10-CM

## 2025-04-17 PROCEDURE — A9579 GAD-BASE MR CONTRAST NOS,1ML: HCPCS | Performed by: NURSE PRACTITIONER

## 2025-04-17 PROCEDURE — 70553 MRI BRAIN STEM W/O & W/DYE: CPT

## 2025-04-17 PROCEDURE — 6360000004 HC RX CONTRAST MEDICATION: Performed by: NURSE PRACTITIONER

## 2025-04-17 RX ADMIN — GADOTERIDOL 20 ML: 279.3 INJECTION, SOLUTION INTRAVENOUS at 09:13
